# Patient Record
Sex: FEMALE | Race: WHITE | NOT HISPANIC OR LATINO | Employment: FULL TIME | URBAN - METROPOLITAN AREA
[De-identification: names, ages, dates, MRNs, and addresses within clinical notes are randomized per-mention and may not be internally consistent; named-entity substitution may affect disease eponyms.]

---

## 2021-05-04 ENCOUNTER — OFFICE VISIT (OUTPATIENT)
Dept: URGENT CARE | Facility: CLINIC | Age: 51
End: 2021-05-04
Payer: COMMERCIAL

## 2021-05-04 VITALS
HEIGHT: 71 IN | BODY MASS INDEX: 23.8 KG/M2 | TEMPERATURE: 97.5 F | RESPIRATION RATE: 18 BRPM | OXYGEN SATURATION: 98 % | HEART RATE: 95 BPM | DIASTOLIC BLOOD PRESSURE: 100 MMHG | SYSTOLIC BLOOD PRESSURE: 162 MMHG | WEIGHT: 170 LBS

## 2021-05-04 DIAGNOSIS — G51.0 BELL'S PALSY: Primary | ICD-10-CM

## 2021-05-04 PROCEDURE — 99213 OFFICE O/P EST LOW 20 MIN: CPT | Performed by: PHYSICIAN ASSISTANT

## 2021-05-04 RX ORDER — VALACYCLOVIR HYDROCHLORIDE 1 G/1
1000 TABLET, FILM COATED ORAL 2 TIMES DAILY
Qty: 14 TABLET | Refills: 0 | Status: SHIPPED | OUTPATIENT
Start: 2021-05-04 | End: 2021-05-11

## 2021-05-04 RX ORDER — PREDNISONE 10 MG/1
TABLET ORAL
Qty: 45 TABLET | Refills: 0 | Status: SHIPPED | OUTPATIENT
Start: 2021-05-04

## 2021-05-04 NOTE — LETTER
May 13, 2021     Patient: Julian Rossi   YOB: 1970   Date of Visit: 5/4/2021       To Whom it May Concern:    Julian Rosis was seen in my clinic on 5/4/2021  She may return to work 5/12/2021 with no restrictions  If you have any questions or concerns, please don't hesitate to call           Sincerely,          Esther Poole PA-C        CC: No Recipients

## 2021-05-04 NOTE — LETTER
May 4, 2021     Patient: Patti Forbes   YOB: 1970   Date of Visit: 5/4/2021       To Whom It May Concern: It is my medical opinion that Patti Forbes may return to work on 5/8/2021  If you have any questions or concerns, please don't hesitate to call           Sincerely,        Gerald Bingham PA-C

## 2021-05-04 NOTE — PROGRESS NOTES
[facility]        NAME: Brenden Landa is a 44 y o  female  : 1970    MRN: [id number]  DATE: May 4, 2021  TIME: 2:49 PM    Assessment and Plan   Bell's palsy [G51 0]  1  Bell's palsy  predniSONE 10 mg tablet    valACYclovir (VALTREX) 1,000 mg tablet    Ambulatory referral to [facility]     Had thorough discussion with patient possibility of stroke  Unable to evaluate for eyebrow motor as patient states eyebrow never works since initial Bell's palsy and concurrent use of Botox injections  Discussed with patient elevated blood pressure in conjunction with current symptoms  Verified with patient she received with [employer] or  [employer] vaccine and not Shiloh Negrete  Advised patient to seek evaluation emergency room but patient declined and signed AMA  Will treat for possible Bell's palsy with referral to family doctor and strict instructions to seek evaluation emergency room if symptoms worsen other way including syncope, confusion, headache, change in vision, slurred speech, numbness and tingling, or involvement of upper lower extremities  Patient reports she already has eyedrops at home  Patient Instructions   Take prednisone as prescribed  Do not use other ibuprofen while taking medication  Take antiviral medication as prescribed  Tape eye shut at night  Use eyedrops at home  Make appoint with family practice  Follow up with PCP in 3-5 days  Proceed to  ER if symptoms worsen  Chief Complaint     Chief Complaint   Patient presents with    Other     pain behind ear yesterday  bells palsy 2x previously  has been being treated with botox  covid vaccine 2 weeks ago  History of Present Illness       Patient is a 68-year-old female with significant past medical history of Bell's palsy presents the office complaining of facial droop since this morning    Patient reports last night she began having pain behind her right ear then woke up this morning with difficultly closing her right eye or use the right side of her mouth  Patient reports 2 prior episodes of Bell's palsy and admits to residual deficits  States she has been unable to raise her right eyebrow since initial episode  She recently moved to the area but has been receiving Botox for her history of Bell's palsy  She denies syncope, confusion, headache, change in vision, slurred speech, tongue involvement, dysphagia, nausea, vomiting, chest pain, shortness of breath, difficulty breathing, numbness and tingling, or unilateral weakness of her body  Denies history of DVT / PE, stroke, or other cardiovascular diseases  Review of Systems   Review of Systems   Constitutional: Negative for chills and fever  Eyes: Negative for visual disturbance  Respiratory: Negative for shortness of breath  Cardiovascular: Negative for chest pain and palpitations  Gastrointestinal: Negative for abdominal pain, diarrhea, nausea and vomiting  Neurological: Positive for facial asymmetry  Negative for dizziness, seizures, syncope, speech difficulty, weakness, light-headedness, numbness and headaches  Psychiatric/Behavioral: Negative for confusion           Current Medications       Current Outpatient Medications:     predniSONE 10 mg tablet, Take 6 pills x 5 days, 5 pills x 1 day, 4 pills x 1 day, 3 pills x 1 day, 2 pills x 1 day, 1 pill x 1 day, Disp: 45 tablet, Rfl: 0    valACYclovir (VALTREX) 1,000 mg tablet, Take 1 tablet (1,000 mg total) by mouth 2 (two) times a day for 7 days, Disp: 14 tablet, Rfl: 0    Current Allergies     Allergies as of 05/04/2021    (No Known Allergies)            The following portions of the patient's history were reviewed and updated as appropriate: allergies, current medications, past family history, past medical history, past social history, past surgical history and problem list      Past Medical History:   Diagnosis Date    Allergic     Bell's palsy        Past Surgical History: Procedure Laterality Date    NO PAST SURGERIES         No family history on file  Medications have been verified  Objective   /100   Pulse 95   Temp 97 5 °F (36 4 °C)   Resp 18   Ht 5' 11" (1 803 m)   Wt 77 1 kg (170 lb)   LMP 03/09/2021   SpO2 98%   BMI 23 71 kg/m²   Patient's last menstrual period was 03/09/2021  Physical Exam     Physical Exam  Vitals signs and nursing note reviewed  Constitutional:       Appearance: Normal appearance  She is well-developed  HENT:      Head: Normocephalic and atraumatic  Right Ear: Tympanic membrane, ear canal and external ear normal       Left Ear: Tympanic membrane, ear canal and external ear normal       Nose: Nose normal       Mouth/Throat:      Pharynx: Uvula midline  Eyes:      General: Lids are normal  Vision grossly intact  Extraocular Movements: Extraocular movements intact  Conjunctiva/sclera: Conjunctivae normal       Pupils: Pupils are equal, round, and reactive to light  Pupils are equal    Neck:      Musculoskeletal: Neck supple  Cardiovascular:      Rate and Rhythm: Normal rate and regular rhythm  Pulses: Normal pulses  Heart sounds: Normal heart sounds  No murmur  No friction rub  No gallop  Pulmonary:      Effort: Pulmonary effort is normal       Breath sounds: Normal breath sounds  No wheezing, rhonchi or rales  Musculoskeletal: Normal range of motion  Lymphadenopathy:      Cervical: No cervical adenopathy  Skin:     General: Skin is warm and dry  Capillary Refill: Capillary refill takes less than 2 seconds  Neurological:      Mental Status: She is alert  Sensory: Sensation is intact  Motor: Motor function is intact  Coordination: Coordination is intact  Gait: Gait is intact  Comments: Right-sided facial droop  Patient unable to smile or frown with right side of mouth  Patient able to shut right eye but with greater difficulty    Patient unable to raise right eyebrow  No slurred speech  Cranial nerves 2-12 intact with exception to facial nerve  Sensation equal and intact to gross touch bilateral   5/5 upper+lower extremity strength

## 2021-05-04 NOTE — LETTER
May 13, 2021     Patient: Luis F Alvarado   YOB: 1970   Date of Visit: 5/4/2021       To Whom it May Concern:    Luis F Alvarado was seen in my clinic on 5/4/2021  She may return to work 5/12/2021 without restrictions  If you have any questions or concerns, please don't hesitate to call           Sincerely,          Melisa Sequeira PA-C        CC: No Recipients

## 2021-05-04 NOTE — LETTER
May 4, 2021     Patient: Vianca Love   YOB: 1970   Date of Visit: 5/4/2021       To Whom It May Concern: It is my medical opinion that Vianca Love may return to work on 05/12/2021  If you have any questions or concerns, please don't hesitate to call           Sincerely,        Shannon Vincent PA-C    CC: No Recipients

## 2021-05-04 NOTE — PATIENT INSTRUCTIONS
Take prednisone as prescribed  Do not use other ibuprofen while taking medication  Take antiviral medication as prescribed  Tape eye shut at night  Use eyedrops at home  Make appoint with family practice  Go to ER if symptoms worsen in any way  Stroke   AMBULATORY CARE:   A stroke  happens when blood flow to part of the brain is interrupted  This can cause serious brain damage from a lack of oxygen  A stroke caused by a blood clot is called an ischemic stroke  A stroke caused by a burst or torn blood vessel is called an intracerebral hemorrhage, or a hemorrhagic stroke  When stroke symptoms go away completely within minutes to hours and do not cause damage, it is called a transient ischemic attack (TIA)  A TIA is a warning sign that you are at risk of soon having a stroke  Know the warning signs of a stroke: The words BE FAST can help you remember and recognize warning signs of a stroke:  · B = Balance:  Sudden loss of balance    · E = Eyes:  Loss of vision in one or both eyes    · F = Face:  Face droops on one side    · A = Arms:  Arm drops when both arms are raised    · S = Speech:  Speech is slurred or sounds different    · T = Time:  Time to get help immediately     Call your local emergency number (911 in the [address] [facility]) for any of the following:   · You have any of the following signs of a stroke:      ? Numbness or drooping on one side of your face     ? Weakness in an arm or leg    ? Confusion or difficulty speaking    ? Dizziness, a severe headache, or vision loss       · You have a seizure  · You have chest pain or shortness of breath  Seek care immediately if:   · Your arm or leg feels warm, tender, and painful  It may look swollen and red  · You have loss of balance or coordination  · You have double vision or vision loss  · You have unusual or heavy bleeding      Call your doctor or neurologist if:   · Your blood sugar level or blood pressure is higher or lower than usual     · You have trouble swallowing  · You have trouble having a bowel movement or urinating  · You have questions or concerns about your condition or care  Signs and symptoms  depend on the type of stroke you had and where it occurred:  · Loss of consciousness    · Loss of vision in one or both eyes    · Sudden weakness or paralysis in your arm, leg, or face    · Sudden trouble walking, speaking, or understanding words you hear or read    · Vomiting or a severe headache    Treatment  depends on the type of stroke you had:  · Medicines  may be given to prevent or break up blood clots, or help your blood clot more easily  You may also need medicines to treat high cholesterol, high blood pressure, or diabetes  · Thrombolysis  is a procedure used to break apart clots in an artery  A catheter is guided into the artery until it is near the clot  Medicine is put through the catheter that will help break apart the clot  The clot may be pulled out of the artery  · Surgery  may be used to remove a blood clot or to relieve pressure within your brain  You may also need surgery to remove plaque buildup from your carotid arteries  Recovery testing: Your healthcare provider will test your recovery 90 days (3 months) after your stroke  This may be done over the phone or in person  Your provider will ask how well you can do the activities you did before the stroke  He or she will also ask how well you can do your daily activities without help  Your provider may make recommendations for you based on your test  For example, you may need someone to help you walk safely  You may also need help with daily activities, such as getting dressed  Based on your answers, your provider may do this test again over time  Manage the effects of a stroke:   · Go to stroke rehabilitation (rehab) if directed    Rehab is a program run by specialists who will help you recover abilities you may have lost  Specialists include physical, occupational, and speech therapists  Physical therapists help you gain strength or keep your balance  Occupational therapists teach you new ways to do daily activities  Your therapy may include movements for everyday activities  An example is being able to raise yourself from a chair  A speech therapist helps you improve your ability to talk and swallow  · Make your home safe  Remove anything you might trip over  Tape electrical cords down  Keep paths clear throughout your home  Make sure your home is well lit  Put nonslip materials on surfaces that might be slippery  An example is your bathtub or shower floor  A cane or walker may help you keep your balance as you walk  Prevent another stroke:   · Manage health conditions  A condition such as diabetes can increase your risk for a stroke  Control your blood sugar level if you have hyperglycemia or diabetes  Take your prescribed medicines and check your blood sugar level as directed  · Check your blood pressure as directed  High blood pressure can increase your risk for a stroke  Follow your healthcare provider's directions for controlling your blood pressure  · Do not use nicotine products or illegal drugs  Nicotine and other chemicals in cigarettes and cigars can cause blood vessel damage  Nicotine and illegal drugs both increase your risk for a stroke  Ask your healthcare provider for information if you currently smoke or use drugs and need help to quit  E- cigarettes or smokeless tobacco still contain nicotine  Talk to your healthcare provider before you use these products  · Talk to your healthcare provider about alcohol  Alcohol can raise your blood pressure  The recommended limit is 2 drinks in a day for men and 1 drink in a day for women  Do not binge drink or save a week's worth of alcohol to drink in 1 or 2 days  Limit weekly amounts as directed by your provider  · Eat a variety of healthy foods    Healthy foods include whole-grain breads, low-fat dairy products, beans, lean meats, and fish  Eat at least 5 servings of fruits and vegetables each day  Choose foods that are low in fat, cholesterol, salt, and sugar  Eat foods that are high in potassium, such as potatoes and bananas  A dietitian can help you create healthy meal plans  · Maintain a healthy weight  Ask your healthcare provider how much you should weigh  Ask him or her to help you create a weight loss plan if you are overweight  He or she can help you create small goals if you have a lot of weight to lose  · Exercise as directed  Exercise can lower your blood pressure, cholesterol, weight, and blood sugar levels  Healthcare providers will help you create exercise goals  They can also help you make a plan to reach your goals  For example, you can break exercise into 10 minute periods, 3 times in the day  Find an exercise that you enjoy  This will make it easier for you to reach your exercise goals  · Manage stress  Stress can raise your blood pressure  Find new ways to relax, such as deep breathing or listening to music  What you need to know about depression after a stroke:  Talk to your healthcare provider if you have depression that continues or is getting worse  Your provider may be able to help treat your depression  Your provider can also recommend support groups for you to join  A support group is a place to talk with others who have had a stroke  It may also help to talk to friends and family members about how you are feeling  Tell your family and friends to let your healthcare provider know if they see any signs of depression:  · Extreme sadness    · Avoiding social interaction with family or friends    · A lack of interest in things you once enjoyed    · Irritability    · Trouble sleeping    · Low energy levels    · A change in eating habits or sudden weight gain or loss    Follow up with your doctor or neurologist as directed:   You may need to come in for regular tests of your brain function  Your medicines may also need to be checked  Write down your questions so you remember to ask them during your visits  For support and more information:   · National Stroke Association  [address] , [address]  Phone: 1- 473 - 201-3339  Web Address: [employer] [facility]  [address] Information is for End User's use only and may not be sold, redistributed or otherwise used for commercial purposes  All illustrations and images included in CareNotes® are the copyrighted property of A D A M , Inc  or [address] [place] [address] [facility]  The above information is an  only  It is not intended as medical advice for individual conditions or treatments  Talk to your doctor, nurse or pharmacist before following any medical regimen to see if it is safe and effective for you  Cardoza Palsy   WHAT YOU NEED TO KNOW:   Bell palsy is a sudden weakness or paralysis of one side of your face  Bell palsy occurs when the nerve that controls the muscles in your face becomes swollen or irritated  DISCHARGE INSTRUCTIONS:   Medicines:   · Medicine may be given  to decrease swelling and irritation of your facial nerve  You may receive antiviral medicine if your healthcare provider thinks a virus caused your Bell palsy  Your healthcare provider may also suggest acetaminophen or ibuprofen to reduce pain  These medicines are available without a doctor's order  Ask which medicine to take, and how much you need  Follow directions  Acetaminophen can cause liver damage, and ibuprofen can cause stomach bleeding or kidney damage  · Take your medicine as directed  Contact your healthcare provider if you think your medicine is not helping or if you have side effects  Tell him if you are allergic to any medicine  Keep a list of the medicines, vitamins, and herbs you take  Include the amounts, and when and why you take them   Bring the list or the pill bottles to follow-up visits  Carry your medicine list with you in case of an emergency  Follow up with your healthcare provider as directed:  Write down your questions so you remember to ask them during your visits  Eye care: Your healthcare provider may recommend that you use artificial tears during the day to keep your eye moist  You may need to use an eye ointment at night  You may also need to wear a patch over your eye and tape it shut while you sleep  This helps keep your eye from getting dry and infected  Wear sunglasses to protect your eye from direct sunlight  Stay away from places that have fumes, dust, or other particles in the air that may harm your eye  Physical therapy:  A physical therapist may teach you how to massage your face and exercise the nerves and muscles in your face  This may help you get better sooner and prevent long-term problems  You can exercise on your own when your facial movement begins to return  Open and close your eye, wink, and smile wide  Do the exercises for 15 or 20 minutes several times a day  Contact your healthcare provider if:   · You have a fever  · Your eye becomes red, irritated, or painful  · You have questions or concerns about your condition or care  Return to the emergency department if:   · You develop weakness or numbness on one side of your body (other than your face)  · You have double vision, or you lose vision in your eye  · You have trouble thinking clearly  © Copyright [address] Information is for End User's use only and may not be sold, redistributed or otherwise used for commercial purposes  All illustrations and images included in CareNotes® are the copyrighted property of A D A M , Inc  or [facility]   The above information is an  only  It is not intended as medical advice for individual conditions or treatments   Talk to your doctor, nurse or pharmacist before following any medical regimen to see if it is safe and effective for you

## 2023-08-09 ENCOUNTER — APPOINTMENT (EMERGENCY)
Dept: CT IMAGING | Facility: HOSPITAL | Age: 53
DRG: 711 | End: 2023-08-09
Payer: COMMERCIAL

## 2023-08-09 ENCOUNTER — HOSPITAL ENCOUNTER (INPATIENT)
Facility: HOSPITAL | Age: 53
LOS: 3 days | Discharge: HOME HEALTH | DRG: 711 | End: 2023-08-12
Attending: EMERGENCY MEDICINE | Admitting: SPECIALIST
Payer: COMMERCIAL

## 2023-08-09 ENCOUNTER — APPOINTMENT (EMERGENCY)
Dept: RADIOLOGY | Facility: HOSPITAL | Age: 53
DRG: 711 | End: 2023-08-09
Payer: COMMERCIAL

## 2023-08-09 DIAGNOSIS — L02.415 ABSCESS OF RIGHT THIGH: ICD-10-CM

## 2023-08-09 DIAGNOSIS — M79.604 RIGHT LEG PAIN: Primary | ICD-10-CM

## 2023-08-09 PROBLEM — A41.9 SEPSIS (HCC): Status: ACTIVE | Noted: 2023-08-09

## 2023-08-09 PROBLEM — Z17.1 MALIGNANT NEOPLASM OF UPPER-INNER QUADRANT OF LEFT BREAST IN FEMALE, ESTROGEN RECEPTOR NEGATIVE (HCC): Status: ACTIVE | Noted: 2021-10-19

## 2023-08-09 PROBLEM — C50.212 MALIGNANT NEOPLASM OF UPPER-INNER QUADRANT OF LEFT BREAST IN FEMALE, ESTROGEN RECEPTOR NEGATIVE (HCC): Status: ACTIVE | Noted: 2021-10-19

## 2023-08-09 PROBLEM — I10 ESSENTIAL HYPERTENSION: Status: ACTIVE | Noted: 2021-11-16

## 2023-08-09 PROBLEM — I42.0 DILATED CARDIOMYOPATHY (HCC): Status: ACTIVE | Noted: 2021-11-16

## 2023-08-09 PROBLEM — Z90.12 STATUS POST PARTIAL MASTECTOMY OF LEFT BREAST: Status: ACTIVE | Noted: 2022-07-15

## 2023-08-09 LAB
ALBUMIN SERPL BCP-MCNC: 4 G/DL (ref 3.5–5)
ALP SERPL-CCNC: 73 U/L (ref 34–104)
ALT SERPL W P-5'-P-CCNC: 36 U/L (ref 7–52)
ANION GAP SERPL CALCULATED.3IONS-SCNC: 11 MMOL/L
APTT PPP: 26 SECONDS (ref 23–37)
AST SERPL W P-5'-P-CCNC: 28 U/L (ref 13–39)
ATRIAL RATE: 87 BPM
BASOPHILS # BLD AUTO: 0.08 THOUSANDS/ÂΜL (ref 0–0.1)
BASOPHILS NFR BLD AUTO: 1 % (ref 0–1)
BILIRUB SERPL-MCNC: 1.46 MG/DL (ref 0.2–1)
BUN SERPL-MCNC: 14 MG/DL (ref 5–25)
CALCIUM SERPL-MCNC: 9.4 MG/DL (ref 8.4–10.2)
CHLORIDE SERPL-SCNC: 103 MMOL/L (ref 96–108)
CK SERPL-CCNC: 127 U/L (ref 26–192)
CO2 SERPL-SCNC: 21 MMOL/L (ref 21–32)
CREAT SERPL-MCNC: 0.75 MG/DL (ref 0.6–1.3)
EOSINOPHIL # BLD AUTO: 0.12 THOUSAND/ÂΜL (ref 0–0.61)
EOSINOPHIL NFR BLD AUTO: 1 % (ref 0–6)
ERYTHROCYTE [DISTWIDTH] IN BLOOD BY AUTOMATED COUNT: 14.3 % (ref 11.6–15.1)
GFR SERPL CREATININE-BSD FRML MDRD: 90 ML/MIN/1.73SQ M
GLUCOSE SERPL-MCNC: 123 MG/DL (ref 65–140)
HCT VFR BLD AUTO: 35.4 % (ref 34.8–46.1)
HGB BLD-MCNC: 11.4 G/DL (ref 11.5–15.4)
IMM GRANULOCYTES # BLD AUTO: 0.08 THOUSAND/UL (ref 0–0.2)
IMM GRANULOCYTES NFR BLD AUTO: 1 % (ref 0–2)
INR PPP: 0.95 (ref 0.84–1.19)
LACTATE SERPL-SCNC: 1.1 MMOL/L (ref 0.5–2)
LYMPHOCYTES # BLD AUTO: 0.96 THOUSANDS/ÂΜL (ref 0.6–4.47)
LYMPHOCYTES NFR BLD AUTO: 7 % (ref 14–44)
MCH RBC QN AUTO: 27.9 PG (ref 26.8–34.3)
MCHC RBC AUTO-ENTMCNC: 32.2 G/DL (ref 31.4–37.4)
MCV RBC AUTO: 87 FL (ref 82–98)
MONOCYTES # BLD AUTO: 1.27 THOUSAND/ÂΜL (ref 0.17–1.22)
MONOCYTES NFR BLD AUTO: 10 % (ref 4–12)
NEUTROPHILS # BLD AUTO: 10.74 THOUSANDS/ÂΜL (ref 1.85–7.62)
NEUTS SEG NFR BLD AUTO: 80 % (ref 43–75)
NRBC BLD AUTO-RTO: 0 /100 WBCS
P AXIS: 46 DEGREES
PLATELET # BLD AUTO: 406 THOUSANDS/UL (ref 149–390)
PMV BLD AUTO: 9.1 FL (ref 8.9–12.7)
POTASSIUM SERPL-SCNC: 3.8 MMOL/L (ref 3.5–5.3)
PR INTERVAL: 156 MS
PROCALCITONIN SERPL-MCNC: 0.17 NG/ML
PROT SERPL-MCNC: 7.8 G/DL (ref 6.4–8.4)
PROTHROMBIN TIME: 12.7 SECONDS (ref 11.6–14.5)
QRS AXIS: 66 DEGREES
QRSD INTERVAL: 104 MS
QT INTERVAL: 394 MS
QTC INTERVAL: 474 MS
RBC # BLD AUTO: 4.09 MILLION/UL (ref 3.81–5.12)
SODIUM SERPL-SCNC: 135 MMOL/L (ref 135–147)
T WAVE AXIS: 86 DEGREES
VENTRICULAR RATE: 87 BPM
WBC # BLD AUTO: 13.25 THOUSAND/UL (ref 4.31–10.16)

## 2023-08-09 PROCEDURE — 85610 PROTHROMBIN TIME: CPT | Performed by: EMERGENCY MEDICINE

## 2023-08-09 PROCEDURE — 96375 TX/PRO/DX INJ NEW DRUG ADDON: CPT

## 2023-08-09 PROCEDURE — G1004 CDSM NDSC: HCPCS

## 2023-08-09 PROCEDURE — 87186 SC STD MICRODIL/AGAR DIL: CPT | Performed by: SPECIALIST

## 2023-08-09 PROCEDURE — 96366 THER/PROPH/DIAG IV INF ADDON: CPT

## 2023-08-09 PROCEDURE — 99223 1ST HOSP IP/OBS HIGH 75: CPT | Performed by: SPECIALIST

## 2023-08-09 PROCEDURE — 99284 EMERGENCY DEPT VISIT MOD MDM: CPT

## 2023-08-09 PROCEDURE — 96368 THER/DIAG CONCURRENT INF: CPT

## 2023-08-09 PROCEDURE — 73701 CT LOWER EXTREMITY W/DYE: CPT

## 2023-08-09 PROCEDURE — 85025 COMPLETE CBC W/AUTO DIFF WBC: CPT | Performed by: EMERGENCY MEDICINE

## 2023-08-09 PROCEDURE — 0J9L0ZZ DRAINAGE OF RIGHT UPPER LEG SUBCUTANEOUS TISSUE AND FASCIA, OPEN APPROACH: ICD-10-PCS | Performed by: SPECIALIST

## 2023-08-09 PROCEDURE — 96365 THER/PROPH/DIAG IV INF INIT: CPT

## 2023-08-09 PROCEDURE — 99285 EMERGENCY DEPT VISIT HI MDM: CPT | Performed by: EMERGENCY MEDICINE

## 2023-08-09 PROCEDURE — 80053 COMPREHEN METABOLIC PANEL: CPT | Performed by: EMERGENCY MEDICINE

## 2023-08-09 PROCEDURE — 87147 CULTURE TYPE IMMUNOLOGIC: CPT | Performed by: SPECIALIST

## 2023-08-09 PROCEDURE — 82550 ASSAY OF CK (CPK): CPT | Performed by: EMERGENCY MEDICINE

## 2023-08-09 PROCEDURE — NC001 PR NO CHARGE: Performed by: SPECIALIST

## 2023-08-09 PROCEDURE — 87040 BLOOD CULTURE FOR BACTERIA: CPT | Performed by: EMERGENCY MEDICINE

## 2023-08-09 PROCEDURE — 84145 PROCALCITONIN (PCT): CPT | Performed by: EMERGENCY MEDICINE

## 2023-08-09 PROCEDURE — 93005 ELECTROCARDIOGRAM TRACING: CPT

## 2023-08-09 PROCEDURE — 87205 SMEAR GRAM STAIN: CPT | Performed by: SPECIALIST

## 2023-08-09 PROCEDURE — 87070 CULTURE OTHR SPECIMN AEROBIC: CPT | Performed by: SPECIALIST

## 2023-08-09 PROCEDURE — 83605 ASSAY OF LACTIC ACID: CPT | Performed by: EMERGENCY MEDICINE

## 2023-08-09 PROCEDURE — 85730 THROMBOPLASTIN TIME PARTIAL: CPT | Performed by: EMERGENCY MEDICINE

## 2023-08-09 PROCEDURE — 71045 X-RAY EXAM CHEST 1 VIEW: CPT

## 2023-08-09 PROCEDURE — 10061 I&D ABSCESS COMP/MULTIPLE: CPT | Performed by: SPECIALIST

## 2023-08-09 PROCEDURE — 36415 COLL VENOUS BLD VENIPUNCTURE: CPT | Performed by: EMERGENCY MEDICINE

## 2023-08-09 RX ORDER — MAGNESIUM CHLORIDE 64 MG
64 TABLET, DELAYED RELEASE (ENTERIC COATED) ORAL DAILY
Status: DISCONTINUED | OUTPATIENT
Start: 2023-08-09 | End: 2023-08-12 | Stop reason: HOSPADM

## 2023-08-09 RX ORDER — CEFAZOLIN SODIUM 2 G/50ML
2000 SOLUTION INTRAVENOUS EVERY 8 HOURS
Status: DISCONTINUED | OUTPATIENT
Start: 2023-08-09 | End: 2023-08-11

## 2023-08-09 RX ORDER — ENOXAPARIN SODIUM 100 MG/ML
40 INJECTION SUBCUTANEOUS DAILY
Status: DISCONTINUED | OUTPATIENT
Start: 2023-08-09 | End: 2023-08-12 | Stop reason: HOSPADM

## 2023-08-09 RX ORDER — LOSARTAN POTASSIUM 50 MG/1
50 TABLET ORAL DAILY
Status: DISCONTINUED | OUTPATIENT
Start: 2023-08-09 | End: 2023-08-12 | Stop reason: HOSPADM

## 2023-08-09 RX ORDER — TURMERIC 400 MG
1 CAPSULE ORAL DAILY
COMMUNITY

## 2023-08-09 RX ORDER — HYDROCODONE BITARTRATE AND ACETAMINOPHEN 5; 325 MG/1; MG/1
1 TABLET ORAL EVERY 6 HOURS PRN
Status: DISCONTINUED | OUTPATIENT
Start: 2023-08-09 | End: 2023-08-11

## 2023-08-09 RX ORDER — VALSARTAN 80 MG/1
80 TABLET ORAL DAILY
COMMUNITY
Start: 2023-06-13

## 2023-08-09 RX ORDER — MORPHINE SULFATE 4 MG/ML
4 INJECTION, SOLUTION INTRAMUSCULAR; INTRAVENOUS ONCE
Status: COMPLETED | OUTPATIENT
Start: 2023-08-09 | End: 2023-08-09

## 2023-08-09 RX ORDER — SODIUM CHLORIDE, SODIUM LACTATE, POTASSIUM CHLORIDE, CALCIUM CHLORIDE 600; 310; 30; 20 MG/100ML; MG/100ML; MG/100ML; MG/100ML
150 INJECTION, SOLUTION INTRAVENOUS CONTINUOUS
Status: DISCONTINUED | OUTPATIENT
Start: 2023-08-09 | End: 2023-08-10

## 2023-08-09 RX ORDER — MAGNESIUM CHLORIDE 64 MG
64 TABLET, DELAYED RELEASE (ENTERIC COATED) ORAL DAILY
COMMUNITY
Start: 2023-02-24

## 2023-08-09 RX ORDER — BIOTIN 1 MG
1 TABLET ORAL 3 TIMES DAILY
COMMUNITY

## 2023-08-09 RX ADMIN — SODIUM CHLORIDE 3 G: 9 INJECTION, SOLUTION INTRAVENOUS at 10:37

## 2023-08-09 RX ADMIN — HYDROCODONE BITARTRATE AND ACETAMINOPHEN 1 TABLET: 5; 325 TABLET ORAL at 22:38

## 2023-08-09 RX ADMIN — VITAM B12 100 MCG: 100 TAB at 15:07

## 2023-08-09 RX ADMIN — SODIUM CHLORIDE, SODIUM LACTATE, POTASSIUM CHLORIDE, AND CALCIUM CHLORIDE 150 ML/HR: .6; .31; .03; .02 INJECTION, SOLUTION INTRAVENOUS at 22:39

## 2023-08-09 RX ADMIN — CEFAZOLIN SODIUM 2000 MG: 2 SOLUTION INTRAVENOUS at 15:08

## 2023-08-09 RX ADMIN — HYDROCODONE BITARTRATE AND ACETAMINOPHEN 1 TABLET: 5; 325 TABLET ORAL at 16:32

## 2023-08-09 RX ADMIN — CEFAZOLIN SODIUM 2000 MG: 2 SOLUTION INTRAVENOUS at 22:38

## 2023-08-09 RX ADMIN — MAGNESIUM 64 MG (MAGNESIUM CHLORIDE) TABLET,DELAYED RELEASE 64 MG: at 15:08

## 2023-08-09 RX ADMIN — LOSARTAN POTASSIUM 50 MG: 50 TABLET, FILM COATED ORAL at 15:06

## 2023-08-09 RX ADMIN — SODIUM CHLORIDE, SODIUM LACTATE, POTASSIUM CHLORIDE, AND CALCIUM CHLORIDE 150 ML/HR: .6; .31; .03; .02 INJECTION, SOLUTION INTRAVENOUS at 10:30

## 2023-08-09 RX ADMIN — Medication 1 TABLET: at 15:06

## 2023-08-09 RX ADMIN — IOHEXOL 100 ML: 350 INJECTION, SOLUTION INTRAVENOUS at 11:31

## 2023-08-09 RX ADMIN — SODIUM CHLORIDE, SODIUM LACTATE, POTASSIUM CHLORIDE, AND CALCIUM CHLORIDE 2000 ML: .6; .31; .03; .02 INJECTION, SOLUTION INTRAVENOUS at 10:29

## 2023-08-09 RX ADMIN — MORPHINE SULFATE 4 MG: 4 INJECTION INTRAVENOUS at 10:27

## 2023-08-09 RX ADMIN — ENOXAPARIN SODIUM 40 MG: 40 INJECTION SUBCUTANEOUS at 15:07

## 2023-08-09 NOTE — ED PROVIDER NOTES
History  Chief Complaint   Patient presents with   • Leg Pain     Had surgery on her leg 31st where she had part of a muscle taken from her leg and put in her face. Pain started Sunday and took pain medication Monday, yesterday received Abx and told to see PCP if pain increases or fever. PCP told her to come to ED. Patient is a pleasant 57-year-old female to the emergency room complaining of severe right thigh pain. Patient underwent muscle transplantation surgery in [facility] on July 31. She had a drain pulled from her right thigh on Saturday and had been discharged to home. She started to develop pain on Sunday and then yesterday was started on antibiotic therapy by her surgeon in [facility]. Swelling became worse. Pain increased. Patient apparently spoke to surgeon who advised her to follow-up with PCP. PCP referred patient to the emergency department. Patient has a history of right-sided facial droop secondary to Bell's palsy. Patient reported to me that she had gone to [facility] for this surgery to correct that facial issue. History provided by:  Patient and relative  Leg Pain  Location:  Leg  Leg location:  R upper leg  Pain details:     Quality:  Aching and pressure    Radiates to:  Does not radiate    Severity:  Severe    Onset quality:  Gradual    Timing:  Constant    Progression:  Worsening  Associated symptoms: fever (Low-grade)        Prior to Admission Medications   Prescriptions Last Dose Informant Patient Reported? Taking?    Biotin 1000 MCG tablet   Yes Yes   Sig: Take 1 mg by mouth Three times a day   Multiple Vitamin (MULTIVITAMIN PO)   Yes Yes   Sig: Take 1 tablet by mouth daily   Turmeric 400 MG CAPS   Yes Yes   Sig: Take 1 capsule by mouth daily   cyanocobalamin (VITAMIN B-12) 100 MCG tablet   Yes Yes   Sig: Take 100 mcg by mouth daily   dapagliflozin (Farxiga) 10 MG tablet   Yes Yes   Sig: Take 10 mg by mouth daily   magnesium chloride (MAG64) 64 MG TBEC EC tablet   Yes Yes   Sig: Take 64 mg by mouth in the morning   valsartan (DIOVAN) 80 mg tablet   Yes Yes   Sig: Take 80 mg by mouth daily      Facility-Administered Medications: None       Past Medical History:   Diagnosis Date   • Allergic    • Bell's palsy        Past Surgical History:   Procedure Laterality Date   • GRACILLIS MUSCLE TRANSFER AND  STEINDLER FLEXOR PLASTY Right    • NO PAST SURGERIES         History reviewed. No pertinent family history. I have reviewed and agree with the history as documented. E-Cigarette/Vaping   • E-Cigarette Use Never User      E-Cigarette/Vaping Substances     Social History     Tobacco Use   • Smoking status: Never   • Smokeless tobacco: Never   Vaping Use   • Vaping Use: Never used   Substance Use Topics   • Alcohol use: Never   • Drug use: Never       Review of Systems   Constitutional: Positive for fever (Low-grade). Respiratory: Negative for shortness of breath and wheezing. Cardiovascular: Negative for chest pain and palpitations. Gastrointestinal: Negative for diarrhea, nausea and vomiting. Musculoskeletal: Positive for myalgias. Skin: Positive for color change and rash. All other systems reviewed and are negative. Physical Exam  Physical Exam  Vitals and nursing note reviewed. Constitutional:       General: She is awake. She is in acute distress. Appearance: Normal appearance. She is well-developed. She is not ill-appearing or toxic-appearing. HENT:      Head: Normocephalic and atraumatic. Right Ear: External ear normal.      Left Ear: External ear normal.      Nose: Nose normal.      Mouth/Throat:      Mouth: Mucous membranes are moist.   Eyes:      General: Lids are normal. No scleral icterus. Extraocular Movements: Extraocular movements intact. Pupils: Pupils are equal, round, and reactive to light. Cardiovascular:      Rate and Rhythm: Regular rhythm. Tachycardia present. Heart sounds: Normal heart sounds. No murmur heard.   Pulmonary: Effort: Pulmonary effort is normal. No respiratory distress. Breath sounds: Normal breath sounds. No wheezing, rhonchi or rales. Abdominal:      General: Abdomen is flat. There is no distension. Palpations: Abdomen is soft. Tenderness: There is no abdominal tenderness. There is no guarding or rebound. Musculoskeletal:         General: Swelling and tenderness present. No deformity. Normal range of motion. Cervical back: Normal range of motion and neck supple. Right upper leg: Swelling, edema and tenderness present. Legs:       Comments: Distal neurovascular appears to be intact at this time. Skin:     General: Skin is warm and dry. Coloration: Skin is not jaundiced or pale. Findings: No rash. Neurological:      Mental Status: She is alert and oriented to person, place, and time. Mental status is at baseline. Cranial Nerves: No cranial nerve deficit. Motor: No weakness. Psychiatric:         Attention and Perception: Attention normal.         Mood and Affect: Mood is anxious. Affect is tearful.          Speech: Speech normal.         Behavior: Behavior normal.         Vital Signs  ED Triage Vitals [08/09/23 1004]   Temperature Pulse Respirations Blood Pressure SpO2   (!) 97.3 °F (36.3 °C) (!) 107 18 108/65 98 %      Temp Source Heart Rate Source Patient Position - Orthostatic VS BP Location FiO2 (%)   Temporal Monitor Sitting Right arm --      Pain Score       9           Vitals:    08/09/23 1004 08/09/23 1100 08/09/23 1300   BP: 108/65     Pulse: (!) 107 89 80   Patient Position - Orthostatic VS: Sitting           Visual Acuity      ED Medications  Medications   lactated ringers infusion (150 mL/hr Intravenous New Bag 8/9/23 1030)   morphine injection 4 mg (4 mg Intravenous Given 8/9/23 1027)   ampicillin-sulbactam (UNASYN) 3 g in sodium chloride 0.9 % 100 mL IVPB (0 g Intravenous Stopped 8/9/23 1149)   lactated ringers bolus 2,000 mL (0 mL Intravenous Stopped 8/9/23 1303)   iohexol (OMNIPAQUE) 350 MG/ML injection (SINGLE-DOSE) 100 mL (100 mL Intravenous Given 8/9/23 1131)       Diagnostic Studies  Results Reviewed     Procedure Component Value Units Date/Time    Wound culture and Gram stain [[id number]] Collected: 08/09/23 1303    Lab Status: In process Specimen: Wound from Leg, Right Updated: 08/09/23 1306    Procalcitonin [[id number]]  (Normal) Collected: 08/09/23 1021    Lab Status: Final result Specimen: Blood from Arm, Left Updated: 08/09/23 1058     Procalcitonin 0.17 ng/ml     Lactic acid [[id number]]  (Normal) Collected: 08/09/23 1021    Lab Status: Final result Specimen: Blood from Arm, Left Updated: 08/09/23 1057     LACTIC ACID 1.1 mmol/L     Narrative:      Result may be elevated if tourniquet was used during collection.     CK [[id number]]  (Normal) Collected: 08/09/23 1021    Lab Status: Final result Specimen: Blood from Arm, Left Updated: 08/09/23 1057     Total  U/L     Comprehensive metabolic panel [[id number]]  (Abnormal) Collected: 08/09/23 1021    Lab Status: Final result Specimen: Blood from Arm, Left Updated: 08/09/23 1057     Sodium 135 mmol/L      Potassium 3.8 mmol/L      Chloride 103 mmol/L      CO2 21 mmol/L      ANION GAP 11 mmol/L      BUN 14 mg/dL      Creatinine 0.75 mg/dL      Glucose 123 mg/dL      Calcium 9.4 mg/dL      AST 28 U/L      ALT 36 U/L      Alkaline Phosphatase 73 U/L      Total Protein 7.8 g/dL      Albumin 4.0 g/dL      Total Bilirubin 1.46 mg/dL      eGFR 90 ml/min/1.73sq m     Narrative:      [place] guidelines for Chronic Kidney Disease (CKD):   •  Stage 1 with normal or high GFR (GFR > 90 mL/min/1.73 square meters)  •  Stage 2 Mild CKD (GFR = 60-89 mL/min/1.73 square meters)  •  Stage 3A Moderate CKD (GFR = 45-59 mL/min/1.73 square meters)  •  Stage 3B Moderate CKD (GFR = 30-44 mL/min/1.73 square meters)  •  Stage 4 Severe CKD (GFR = 15-29 mL/min/1.73 square meters)  •  Stage 5 End Stage CKD (GFR <15 mL/min/1.73 square meters)  Note: GFR calculation is accurate only with a steady state creatinine    Protime-INR [[id number]]  (Normal) Collected: 08/09/23 1021    Lab Status: Final result Specimen: Blood from Arm, Left Updated: 08/09/23 1045     Protime 12.7 seconds      INR 0.95    APTT [[id number]]  (Normal) Collected: 08/09/23 1021    Lab Status: Final result Specimen: Blood from Arm, Left Updated: 08/09/23 1045     PTT 26 seconds     CBC and differential [[id number]]  (Abnormal) Collected: 08/09/23 1021    Lab Status: Final result Specimen: Blood from Arm, Left Updated: 08/09/23 1028     WBC 13.25 Thousand/uL      RBC 4.09 Million/uL      Hemoglobin 11.4 g/dL      Hematocrit 35.4 %      MCV 87 fL      MCH 27.9 pg      MCHC 32.2 g/dL      RDW 14.3 %      MPV 9.1 fL      Platelets 778 Thousands/uL      nRBC 0 /100 WBCs      Neutrophils Relative 80 %      Immat GRANS % 1 %      Lymphocytes Relative 7 %      Monocytes Relative 10 %      Eosinophils Relative 1 %      Basophils Relative 1 %      Neutrophils Absolute 10.74 Thousands/µL      Immature Grans Absolute 0.08 Thousand/uL      Lymphocytes Absolute 0.96 Thousands/µL      Monocytes Absolute 1.27 Thousand/µL      Eosinophils Absolute 0.12 Thousand/µL      Basophils Absolute 0.08 Thousands/µL     Blood culture #2 [[id number]] Collected: 08/09/23 1021    Lab Status: In process Specimen: Blood from Arm, Left Updated: 08/09/23 1027    Blood culture #1 [[id number]] Collected: 08/09/23 1021    Lab Status: In process Specimen: Blood from Arm, Right Updated: 08/09/23 1027                 CT lower extremity w contrast right   Final Result by Kane Guo DO (08/09 1145)      Rim-enhancing fluid collection in the medial thigh, containing multiple locules of gas in the medial thigh. Findings are concerning for abscess. Postoperative hematoma or seroma not excluded. The study was marked in [facility] for immediate notification.       Workstation performed: [id number]         XR chest portable   ED Interpretation by Magen Parr DO (08/09 1101)   No active disease      Final Result by Tisha Narvaez MD (08/09 1113)      No acute cardiopulmonary disease. Workstation performed: [id number]                    Procedures  ECG 12 Lead Documentation Only    Date/Time: 8/9/2023 11:06 AM    Performed by: Magen Parr DO  Authorized by: Magen Parr DO    Indications / Diagnosis:  Fever eval  ECG reviewed by me, the ED Provider: yes    Patient location:  ED  Previous ECG:     Previous ECG:  Unavailable  Interpretation:     Interpretation: normal    Rate:     ECG rate assessment: normal    Rhythm:     Rhythm: sinus rhythm    Ectopy:     Ectopy: none    QRS:     QRS axis:  Normal  Conduction:     Conduction: normal    ST segments:     ST segments:  Normal  T waves:     T waves: normal               ED Course  ED Course as of 08/09/23 1329   Wed Aug 09, 2023   1020 Patient's BMI > 30. For purposes of fluid resuscitation, IBW was utilized to calculate target volume to be administered. 80 CT concerning for a postoperative abscess. Hematoma or seroma is not ruled out. Case discussed with surgery. They will be down to see the patient. 1207 Case also discussed with Dr Jose Alberto Prakash [facility] surgeon [phone]). Recommends removing a few of the sutures, allow for drainage and then pack. He is agreeable to following remotely with home care nursing. [facility] Dr. Abbe Do to see patient. Plans for bedside procedure. 1327 Surgery will admit to their service. SBIRT 22yo+    Flowsheet Row Most Recent Value   Initial Alcohol Screen: US AUDIT-C     1. How often do you have a drink containing alcohol? 0 Filed at: 08/09/2023 1004   2. How many drinks containing alcohol do you have on a typical day you are drinking? 0 Filed at: 08/09/2023 1004   3b. FEMALE Any Age, or MALE 65+:  How often do you have 4 or more drinks on one occassion? 0 Filed at: 08/09/2023 1004   Audit-C Score 0 Filed at: 08/09/2023 1004   HERRERA: How many times in the past year have you. .. Used an illegal drug or used a prescription medication for non-medical reasons? Never Filed at: 08/09/2023 1004                    Medical Decision Making  Patient presented to the emergency department and a MSE was performed. The patient was evaluated for complaint related to acute right-sided thigh pain and swelling. Patient is potentially at risk for, but not limited to, abscess, cellulitis, necrotizing fasciitis, compartment syndrome, hematoma formation. Several of these diagnoses have been evaluated and ruled out by history and physical.  As needed, patient will be further evaluated with laboratory and imaging studies. Higher level diagnostics, such as CT imaging or ultrasound, may also be required. Please see work-up portion of the note for further evaluation of patient's risk. Socioeconomic factors were also considered as part of the decision-making process. Unless otherwise stated in the chart or patient is admitted as elsewhere documented, any previously prescribed medications will be maintained. Abscess of right thigh: complicated acute illness or injury with systemic symptoms that poses a threat to life or bodily functions  Right leg pain: acute illness or injury  Amount and/or Complexity of Data Reviewed  Labs: ordered. Radiology: ordered and independent interpretation performed. Discussion of management or test interpretation with external provider(s): Discussed the case with the patient's surgeon in [facility] as well as their CRNP. Also discussed the case with our local general surgeon. Risk  Prescription drug management. Decision regarding hospitalization. Minor surgery with no identified risk factors. Risk Details: Patient presented to the emergency department and a MSE was performed.  The patient was evaluated and diagnosed with acute right thigh abscess status post surgery. This is a new issue that will require additional planned work-up and treatment in a hospitalized setting. As may have been required as part of this evaluation, clinical laboratory test, radiology imaging and medical testing (I.e. EKG) were ordered as necessitated by the patient's presentation. I independently reviewed these studies, imaging and testing. This patient's case is considered to be a considerable risk secondary to the above listed disease process and poses a threat to the patient's well-being and baseline function. Further in-patient diagnostic testing and management, which may include the administration of parenteral medications, is required. Disposition  Final diagnoses:   Right leg pain   Abscess of right thigh     Time reflects when diagnosis was documented in both MDM as applicable and the Disposition within this note     Time User Action Codes Description Comment    8/9/2023 12:06 PM Amanuel Alex [Q28.945] Right leg pain     8/9/2023 12:06 PM Amanuel Alex [L02.415] Abscess of right thigh       ED Disposition     ED Disposition   Admit    Condition   Stable    Date/Time   Wed Aug 9, 2023 12:06 PM    Comment              Follow-up Information    None         Patient's Medications   Discharge Prescriptions    No medications on file       No discharge procedures on file.     PDMP Review     None          ED Provider  Electronically Signed by           Lilli Hull DO  08/09/23 5950

## 2023-08-09 NOTE — PLAN OF CARE
Problem: PAIN - ADULT  Goal: Verbalizes/displays adequate comfort level or baseline comfort level  Description: Interventions:  - Encourage patient to monitor pain and request assistance  - Assess pain using appropriate pain scale  - Administer analgesics based on type and severity of pain and evaluate response  - Implement non-pharmacological measures as appropriate and evaluate response  - Consider cultural and social influences on pain and pain management  - Notify physician/advanced practitioner if interventions unsuccessful or patient reports new pain  Outcome: Progressing     Problem: INFECTION - ADULT  Goal: Absence or prevention of progression during hospitalization  Description: INTERVENTIONS:  - Assess and monitor for signs and symptoms of infection  - Monitor lab/diagnostic results  - Monitor all insertion sites, i.e. indwelling lines, tubes, and drains  - Monitor endotracheal if appropriate and nasal secretions for changes in amount and color  - [place] appropriate cooling/warming therapies per order  - Administer medications as ordered  - Instruct and encourage patient and family to use good hand hygiene technique  - Identify and instruct in appropriate isolation precautions for identified infection/condition  Outcome: Progressing  Goal: Absence of fever/infection during neutropenic period  Description: INTERVENTIONS:  - Monitor WBC    Outcome: Progressing     Problem: DISCHARGE PLANNING  Goal: Discharge to home or other facility with appropriate resources  Description: INTERVENTIONS:  - Identify barriers to discharge w/patient and caregiver  - Arrange for needed discharge resources and transportation as appropriate  - Identify discharge learning needs (meds, wound care, etc.)  - Arrange for interpretive services to assist at discharge as needed  - Refer to Case Management Department for coordinating discharge planning if the patient needs post-hospital services based on physician/advanced practitioner order or complex needs related to functional status, cognitive ability, or social support system  Outcome: Progressing     Problem: Knowledge Deficit  Goal: Patient/family/caregiver demonstrates understanding of disease process, treatment plan, medications, and discharge instructions  Description: Complete learning assessment and assess knowledge base.   Interventions:  - Provide teaching at level of understanding  - Provide teaching via preferred learning methods  Outcome: Progressing

## 2023-08-09 NOTE — PROCEDURES
Incision and drain    Date/Time: 8/9/2023 2:11 PM    Performed by: Severo Vasquez MD  Authorized by: Severo Vasquez MD  Universal Protocol:  Consent: Written consent obtained. Risks and benefits: risks, benefits and alternatives were discussed  Consent given by: patient  Time out: Immediately prior to procedure a "time out" was called to verify the correct patient, procedure, equipment, support staff and site/side marked as required. Timeout called at: 8/9/2023 1:00 PM.  Patient understanding: patient states understanding of the procedure being performed  Patient identity confirmed: verbally with patient      Patient location:  ED  Location:     Type:  Abscess     and fluid collection    Size:  8 cm    Location:  Lower extremity    Lower extremity location:  R leg  Pre-procedure details:     Skin preparation:  Betadine  Anesthesia (see MAR for exact dosages): Anesthesia method:  Local infiltration    Local anesthetic:  Lidocaine 1% WITH epi  Procedure details:     Complexity:  Simple    Incision types: Other (comment) (opening of fresh incision)    Approach:  Open    Incision depth:  Subfascial    Wound management:  Probed and deloculated    Drainage:  Serosanguinous and purulent    Drainage amount:  Copious    Wound treatment:  Packing placed    Packing material: 5 yds of 1 " packing. Post-procedure details:     Patient tolerance of procedure:   Tolerated well, no immediate complications

## 2023-08-09 NOTE — H&P
H&P Exam - General Surgery   Yasmin Cubao 45 y.o. female MRN: [id number]  Unit/Bed#: ED 02 Encounter: [id number]    Assessment/Plan     Assessment:  The patient is a 40-year-old white female with history of facial paralysis due to Right side Bell's palsy who is status post nerve interposition, and gracilis muscle transfer from her right thigh a week ago at [facility] in [place] Emergency Department. The patient presents to [facility] emergency room with pain and swelling in her right thigh, with purulent drainage and cellulitic changes around the incision use for gracilis harvest.  Patient is also noted to have leukocytosis, and malaise, although she denies fever or shaking chills. CT scan reveals complex rim enhancing fluid collection consistent with abscess of the right medial thigh. She does have tachycardia, and meets the criteria for sepsis present on admission. Plan: The fluid collection was drained at the bedside, and cultures obtained. She will be admitted for intravenous antibiotics. She will require VNA for wound care upon discharge. History of Present Illness   HPI:  Rose Marie Ye is a 45 y.o. 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 Patient states that the close suction drain had been removed prior to her discharge from [facility] in [place] Emergency Department after gracillis transfer to her right face for treatment of facial paralysis due to Bell's palsy. Subsequent to discharge she began noting swelling, pain, spontaneous drainage, warmth and tenderness which was becoming unbearable, leading her to present to the emergency room where CT scan revealed a large complex fluid collection suspicious for abscess.   This was subsequently drained through the surgical incision and packed open with iodoform gauze. Review of Systems   Constitutional: Negative for chills and fever. HENT: Negative for trouble swallowing. Post-operative swelling of Left side of face   Eyes: Negative. Respiratory: Negative. Cardiovascular: Negative. Gastrointestinal: Negative. Genitourinary: Negative. Musculoskeletal: Negative. Skin: Positive for color change and wound. Neurological: Negative. Hematological: Negative. Psychiatric/Behavioral: Negative. Historical Information   Past Medical History:   Diagnosis Date   • Allergic    • Bell's palsy      Past Surgical History:   Procedure Laterality Date   • GRACILLIS MUSCLE TRANSFER AND  STEINDLER FLEXOR PLASTY Right    • NO PAST SURGERIES       Social History   Social History     Substance and Sexual Activity   Alcohol Use Never     Social History     Substance and Sexual Activity   Drug Use Never     Social History     Tobacco Use   Smoking Status Never   Smokeless Tobacco Never     E-Cigarette/Vaping   • E-Cigarette Use Never User      E-Cigarette/Vaping Substances     Family History: History reviewed. No pertinent family history. Meds/Allergies   PTA meds:   Prior to Admission Medications   Prescriptions Last Dose Informant Patient Reported? Taking?    Biotin 1000 MCG tablet   Yes Yes   Sig: Take 1 mg by mouth Three times a day   Multiple Vitamin (MULTIVITAMIN PO)   Yes Yes   Sig: Take 1 tablet by mouth daily   Turmeric 400 MG CAPS   Yes Yes   Sig: Take 1 capsule by mouth daily   cyanocobalamin (VITAMIN B-12) 100 MCG tablet   Yes Yes   Sig: Take 100 mcg by mouth daily   dapagliflozin (Farxiga) 10 MG tablet   Yes Yes   Sig: Take 10 mg by mouth daily   magnesium chloride (MAG64) 64 MG TBEC EC tablet   Yes Yes   Sig: Take 64 mg by mouth in the morning   valsartan (DIOVAN) 80 mg tablet   Yes Yes   Sig: Take 80 mg by mouth daily      Facility-Administered Medications: None     No Known Allergies    Objective   First Vitals:   Blood Pressure: 108/65 (08/09/23 1004)  Pulse: (!) 107 (08/09/23 1004)  Temperature: (!) 97.3 °F (36.3 °C) (08/09/23 1004)  Temp Source: Temporal (08/09/23 1004)  Respirations: 18 (08/09/23 1004)  Height: 5' 11" (180.3 cm) (08/09/23 1016)  Weight - Scale: 98.9 kg (218 lb 0.6 oz) (08/09/23 1004)  SpO2: 98 % (08/09/23 1004)    Current Vitals:   Blood Pressure: 108/65 (08/09/23 1004)  Pulse: 80 (08/09/23 1300)  Temperature: (!) 97.3 °F (36.3 °C) (08/09/23 1004)  Temp Source: Temporal (08/09/23 1004)  Respirations: 18 (08/09/23 1300)  Height: 5' 11" (180.3 cm) (08/09/23 1016)  Weight - Scale: 98.9 kg (218 lb 0.6 oz) (08/09/23 1004)  SpO2: 94 % (08/09/23 1300)    No intake or output data in the 24 hours ending 08/09/23 1341    Invasive Devices     Peripheral Intravenous Line  Duration           Peripheral IV 08/09/23 Left Antecubital <1 day    Peripheral IV 08/09/23 Right Antecubital <1 day                Physical Exam  Vitals reviewed. Constitutional:       General: She is not in acute distress. Appearance: She is ill-appearing. She is not toxic-appearing. HENT:      Head:     Eyes:      General: No scleral icterus. Pupils: Pupils are equal, round, and reactive to light. Cardiovascular:      Rate and Rhythm: Normal rate. Pulmonary:      Effort: Pulmonary effort is normal.   Abdominal:      General: Abdomen is flat. Palpations: Abdomen is soft. Genitourinary:     Comments: deferred  Musculoskeletal:      Cervical back: Neck supple. Legs:    Skin:     General: Skin is warm and dry. Coloration: Skin is not jaundiced. Findings: Erythema (of Right Thigh) present. Neurological:      General: No focal deficit present. Mental Status: She is alert and oriented to person, place, and time. Psychiatric:         Mood and Affect: Mood normal.         Lab Results:   I have personally reviewed pertinent lab results.   , CBC:   Lab Results   Component Value Date    WBC 13.25 (H) 08/09/2023    HGB 11.4 (L) 08/09/2023    HCT 35.4 08/09/2023    MCV 87 08/09/2023     (H) 08/09/2023    RBC 4.09 08/09/2023    MCH 27.9 08/09/2023    MCHC 32.2 08/09/2023    RDW 14.3 08/09/2023    MPV 9.1 08/09/2023    NRBC 0 08/09/2023   , CMP:   Lab Results   Component Value Date    SODIUM 135 08/09/2023    K 3.8 08/09/2023     08/09/2023    CO2 21 08/09/2023    BUN 14 08/09/2023    CREATININE 0.75 08/09/2023    CALCIUM 9.4 08/09/2023    AST 28 08/09/2023    ALT 36 08/09/2023    ALKPHOS 73 08/09/2023    EGFR 90 08/09/2023   , Urinalysis: No results found for: "COLORU", "CLARITYU", "SPECGRAV", "PHUR", "LEUKOCYTESUR", "NITRITE", "PROTEINUA", "GLUCOSEU", "Joao Dine", "BILIRUBINUR", "BLOODU", Lipase: No results found for: "LIPASE"  Imaging: I have personally reviewed pertinent reports. and I have personally reviewed pertinent films in PACS   IMPRESSION:     Rim-enhancing fluid collection in the medial thigh, containing multiple locules of gas in the medial thigh. Findings are concerning for abscess. Postoperative hematoma or seroma not excluded.     The study was marked in EPIC for immediate notification.       EKG, Pathology, and Other Studies: I have personally reviewed pertinent reports. Code Status: Level 1 - Full Code  Advance Directive and Living Will:      Power of :    POLST:      Counseling / Coordination of Care  Total floor / unit time spent today 20 minutes. Greater than 50% of total time was spent with the patient and / or family counseling and / or coordination of care. A description of the counseling / coordination of care: discussion with ER attending, and ENT surgeon at [address] [place] [address], [address] [place] [address].

## 2023-08-10 LAB
BASOPHILS # BLD AUTO: 0.05 THOUSANDS/ÂΜL (ref 0–0.1)
BASOPHILS NFR BLD AUTO: 1 % (ref 0–1)
EOSINOPHIL # BLD AUTO: 0.16 THOUSAND/ÂΜL (ref 0–0.61)
EOSINOPHIL NFR BLD AUTO: 2 % (ref 0–6)
ERYTHROCYTE [DISTWIDTH] IN BLOOD BY AUTOMATED COUNT: 14.3 % (ref 11.6–15.1)
HCT VFR BLD AUTO: 29.1 % (ref 34.8–46.1)
HCT VFR BLD AUTO: 30.5 % (ref 34.8–46.1)
HGB BLD-MCNC: 9.2 G/DL (ref 11.5–15.4)
HGB BLD-MCNC: 9.7 G/DL (ref 11.5–15.4)
IMM GRANULOCYTES # BLD AUTO: 0.14 THOUSAND/UL (ref 0–0.2)
IMM GRANULOCYTES NFR BLD AUTO: 1 % (ref 0–2)
LYMPHOCYTES # BLD AUTO: 1.02 THOUSANDS/ÂΜL (ref 0.6–4.47)
LYMPHOCYTES NFR BLD AUTO: 11 % (ref 14–44)
MCH RBC QN AUTO: 28.1 PG (ref 26.8–34.3)
MCHC RBC AUTO-ENTMCNC: 31.8 G/DL (ref 31.4–37.4)
MCV RBC AUTO: 88 FL (ref 82–98)
MONOCYTES # BLD AUTO: 1.24 THOUSAND/ÂΜL (ref 0.17–1.22)
MONOCYTES NFR BLD AUTO: 13 % (ref 4–12)
NEUTROPHILS # BLD AUTO: 7.14 THOUSANDS/ÂΜL (ref 1.85–7.62)
NEUTS SEG NFR BLD AUTO: 72 % (ref 43–75)
NRBC BLD AUTO-RTO: 0 /100 WBCS
PLATELET # BLD AUTO: 334 THOUSANDS/UL (ref 149–390)
PMV BLD AUTO: 9.1 FL (ref 8.9–12.7)
RBC # BLD AUTO: 3.45 MILLION/UL (ref 3.81–5.12)
WBC # BLD AUTO: 9.75 THOUSAND/UL (ref 4.31–10.16)

## 2023-08-10 PROCEDURE — 85025 COMPLETE CBC W/AUTO DIFF WBC: CPT | Performed by: SPECIALIST

## 2023-08-10 PROCEDURE — 85014 HEMATOCRIT: CPT

## 2023-08-10 PROCEDURE — 99024 POSTOP FOLLOW-UP VISIT: CPT | Performed by: PHYSICIAN ASSISTANT

## 2023-08-10 PROCEDURE — 85018 HEMOGLOBIN: CPT

## 2023-08-10 RX ORDER — HYDROMORPHONE HCL IN WATER/PF 6 MG/30 ML
0.2 PATIENT CONTROLLED ANALGESIA SYRINGE INTRAVENOUS EVERY 2 HOUR PRN
Status: DISCONTINUED | OUTPATIENT
Start: 2023-08-10 | End: 2023-08-12 | Stop reason: HOSPADM

## 2023-08-10 RX ADMIN — HYDROCODONE BITARTRATE AND ACETAMINOPHEN 1 TABLET: 5; 325 TABLET ORAL at 05:48

## 2023-08-10 RX ADMIN — HYDROCODONE BITARTRATE AND ACETAMINOPHEN 1 TABLET: 5; 325 TABLET ORAL at 14:24

## 2023-08-10 RX ADMIN — VITAM B12 100 MCG: 100 TAB at 09:06

## 2023-08-10 RX ADMIN — CEFAZOLIN SODIUM 2000 MG: 2 SOLUTION INTRAVENOUS at 05:48

## 2023-08-10 RX ADMIN — CEFAZOLIN SODIUM 2000 MG: 2 SOLUTION INTRAVENOUS at 14:15

## 2023-08-10 RX ADMIN — SODIUM CHLORIDE, SODIUM LACTATE, POTASSIUM CHLORIDE, AND CALCIUM CHLORIDE 150 ML/HR: .6; .31; .03; .02 INJECTION, SOLUTION INTRAVENOUS at 05:48

## 2023-08-10 RX ADMIN — DAPAGLIFLOZIN 10 MG: 10 TABLET, FILM COATED ORAL at 10:59

## 2023-08-10 RX ADMIN — MAGNESIUM 64 MG (MAGNESIUM CHLORIDE) TABLET,DELAYED RELEASE 64 MG: at 11:01

## 2023-08-10 RX ADMIN — ENOXAPARIN SODIUM 40 MG: 40 INJECTION SUBCUTANEOUS at 09:06

## 2023-08-10 RX ADMIN — HYDROMORPHONE HYDROCHLORIDE 0.2 MG: 0.2 INJECTION, SOLUTION INTRAMUSCULAR; INTRAVENOUS; SUBCUTANEOUS at 10:46

## 2023-08-10 RX ADMIN — Medication 1 TABLET: at 09:06

## 2023-08-10 RX ADMIN — CEFAZOLIN SODIUM 2000 MG: 2 SOLUTION INTRAVENOUS at 22:31

## 2023-08-10 NOTE — PLAN OF CARE
Problem: PAIN - ADULT  Goal: Verbalizes/displays adequate comfort level or baseline comfort level  Description: Interventions:  - Encourage patient to monitor pain and request assistance  - Assess pain using appropriate pain scale  - Administer analgesics based on type and severity of pain and evaluate response  - Implement non-pharmacological measures as appropriate and evaluate response  - Consider cultural and social influences on pain and pain management  - Notify physician/advanced practitioner if interventions unsuccessful or patient reports new pain  Outcome: Progressing     Problem: INFECTION - ADULT  Goal: Absence or prevention of progression during hospitalization  Description: INTERVENTIONS:  - Assess and monitor for signs and symptoms of infection  - Monitor lab/diagnostic results  - Monitor all insertion sites, i.e. indwelling lines, tubes, and drains  - Monitor endotracheal if appropriate and nasal secretions for changes in amount and color  - [place] appropriate cooling/warming therapies per order  - Administer medications as ordered  - Instruct and encourage patient and family to use good hand hygiene technique  - Identify and instruct in appropriate isolation precautions for identified infection/condition  Outcome: Progressing  Goal: Absence of fever/infection during neutropenic period  Description: INTERVENTIONS:  - Monitor WBC    Outcome: Progressing     Problem: DISCHARGE PLANNING  Goal: Discharge to home or other facility with appropriate resources  Description: INTERVENTIONS:  - Identify barriers to discharge w/patient and caregiver  - Arrange for needed discharge resources and transportation as appropriate  - Identify discharge learning needs (meds, wound care, etc.)  - Arrange for interpretive services to assist at discharge as needed  - Refer to Case Management Department for coordinating discharge planning if the patient needs post-hospital services based on physician/advanced practitioner order or complex needs related to functional status, cognitive ability, or social support system  Outcome: Progressing     Problem: Knowledge Deficit  Goal: Patient/family/caregiver demonstrates understanding of disease process, treatment plan, medications, and discharge instructions  Description: Complete learning assessment and assess knowledge base.   Interventions:  - Provide teaching at level of understanding  - Provide teaching via preferred learning methods  Outcome: Progressing     Problem: SKIN/TISSUE INTEGRITY - ADULT  Goal: Skin Integrity remains intact(Skin Breakdown Prevention)  Description: Assess:  -Perform Micah assessment every shift  -Clean and moisturize skin every shift and PRN  -Inspect skin when repositioning, toileting, and assisting with ADLS  -Assess extremities for adequate circulation and sensation     Bed Management:  -Have minimal linens on bed & keep smooth, unwrinkled  -Change linens as needed when moist or perspiring    Activity:  -Mobilize patient 3 times a day  -Encourage activity and walks on unit  -Encourage or provide ROM exercises   -Use appropriate equipment to lift or move patient in bed    Skin Care:  -Avoid use of baby powder, tape, friction and shearing, hot water or constrictive clothing  -Do not massage red bony areas    Outcome: Progressing  Goal: Incision(s), wounds(s) or drain site(s) healing without S/S of infection  Description: INTERVENTIONS  - Assess and document dressing, incision, wound bed, drain sites and surrounding tissue  - Provide patient and family education  - Perform skin care/dressing changes every day and PRN  Outcome: Progressing     Problem: HEMATOLOGIC - ADULT  Goal: Maintains hematologic stability  Description: INTERVENTIONS  - Assess for signs and symptoms of bleeding or hemorrhage  - Monitor labs  - Administer supportive blood products/factors as ordered and appropriate  Outcome: Progressing

## 2023-08-10 NOTE — UTILIZATION REVIEW
Initial Clinical Review    Admission: Date/Time/Statement:   Admission Orders (From admission, onward)     Ordered        08/09/23 1328  INPATIENT ADMISSION  Once                      Orders Placed This Encounter   Procedures   • INPATIENT ADMISSION     Standing Status:   Standing     Number of Occurrences:   1     Order Specific Question:   Level of Care     Answer:   Med Surg [16]     Order Specific Question:   Estimated length of stay     Answer:   More than 2 Midnights     Order Specific Question:   Certification     Answer:   I certify that inpatient services are medically necessary for this patient for a duration of greater than two midnights. See H&P and MD Progress Notes for additional information about the patient's course of treatment. ED Arrival Information     Expected   -    Arrival   8/9/2023 09:54    Acuity   Urgent            Means of arrival   Walk-In    Escorted by   Family Member    Service   Surgery-General    Admission type   Emergency            Arrival complaint   wound re-check,leg pain           Chief Complaint   Patient presents with   • Leg Pain     Had surgery on her leg 31st where she had part of a muscle taken from her leg and put in her face. Pain started Sunday and took pain medication Monday, yesterday received Abx and told to see PCP if pain increases or fever. PCP told her to come to ED. Initial Presentation: 45 y.o. female to ED via walk-in from home  Presents with history of facial paralysis due to Right side Bell's palsy who is status post nerve interposition, and gracilis muscle transfer from her right thigh a week ago at [facility].   The patient presents to [address] [place] [address] emergency room with pain and swelling in her right thigh, with purulent drainage and cellulitic changes around the incision use for gracilis harvest.  PMHX IIa ER, VA negative, HER2 positive, infiltrating ductal carcinoma of the left breast hypertension,, status postlumpectomy and chemoradiation, HTN   Admitted to [facility] with DX: Sepsis  on exam: tachy; Erythema (of Right Thigh);  leukocytosis, and malaise  CT scan reveals complex rim enhancing fluid collection consistent with abscess of the right medial thigh. PLAN: The fluid collection was drained at the bedside, and cultures obtained. Cont iv abx; cont ivf; monitor labs; pain control      Date: 8/10/23   Day 2  POD1 s/p incision and drainage of right inner thigh fluid collection  Continues with erythema surrounding wound and pain - pain 5-7/10; wound is packed with iodoform, the outer dressing needed changed several times overnight and this am due to saturation with serous fluid; Hgb with notable drop 9.7 (11.4) - recheck ordered;  Wound culture in progress 2+ polys, 2+ GPC in pairs  Plan: cont iv abx; cont ivf; monitor labs - re-check Heme at 1 pm; pain control; f/u blood / wound cx; cont daily packing of wound - 1 inch packing, covered with 4x4, ABD taped over; CM consulted for [facility]    ED Triage Vitals [08/09/23 1004]   Temperature Pulse Respirations Blood Pressure SpO2   (!) 97.3 °F (36.3 °C) (!) 107 18 108/65 98 %      Temp Source Heart Rate Source Patient Position - Orthostatic VS BP Location FiO2 (%)   Temporal Monitor Sitting Right arm --      Pain Score       9          Wt Readings from Last 1 Encounters:   08/09/23 98.9 kg (218 lb 0.6 oz)     Additional Vital Signs:   Date/Time Temp Pulse Resp BP MAP (mmHg) SpO2 O2 Device Patient Position - Orthostatic VS   08/10/23 09:06:17 -- 94 -- 99/63  75 96 % None (Room air) Lying   BP: asymptomatic at 08/10/23 0906   08/10/23 06:58:30 97.5 °F (36.4 °C) 89 19 111/67 82 96 % -- --   08/09/23 22:50:25 98.8 °F (37.1 °C) 93 14 105/58 74 97 % -- --   08/09/23 1427 -- -- -- -- -- -- None (Room air) --   08/09/23 14:21:13 98.4 °F (36.9 °C) 92 19 107/73 84 96 % -- --   08/09/23 1300 -- 80 18 108/51 -- 94 % None (Room air) Lying   08/09/23 1100 -- 89 14 -- -- 93 % -- --   08/09/23 1019 -- -- -- -- -- -- None (Room air) --   08/09/23 1004 97.3 °F (36.3 °C) Abnormal  107 Abnormal  18 108/65 -- 98 % None (Room air) Sitting           EKG: Normal sinus rhythm  Normal ECG  No previous ECGs available    Pertinent Labs/Diagnostic Test Results:   CT lower extremity w contrast right   Final Result by Fred Barney DO (08/09 1145)      Rim-enhancing fluid collection in the medial thigh, containing multiple locules of gas in the medial thigh. Findings are concerning for abscess. Postoperative hematoma or seroma not excluded. The study was marked in [facility] for immediate notification. Workstation performed: [id number]         XR chest portable   ED Interpretation by Octavia Fonseca DO (08/09 1101)   No active disease      Final Result by Opal Naidu MD (08/09 1113)      No acute cardiopulmonary disease.                   Workstation performed: [id number]               Results from last 7 days   Lab Units 08/10/23  0551 08/09/23  1021   WBC Thousand/uL 9.75 13.25*   HEMOGLOBIN g/dL 9.7* 11.4*   HEMATOCRIT % 30.5* 35.4   PLATELETS Thousands/uL 334 406*   NEUTROS ABS Thousands/µL 7.14 10.74*         Results from last 7 days   Lab Units 08/09/23  1021   SODIUM mmol/L 135   POTASSIUM mmol/L 3.8   CHLORIDE mmol/L 103   CO2 mmol/L 21   ANION GAP mmol/L 11   BUN mg/dL 14   CREATININE mg/dL 0.75   EGFR ml/min/1.73sq m 90   CALCIUM mg/dL 9.4     Results from last 7 days   Lab Units 08/09/23  1021   AST U/L 28   ALT U/L 36   ALK PHOS U/L 73   TOTAL PROTEIN g/dL 7.8   ALBUMIN g/dL 4.0   TOTAL BILIRUBIN mg/dL 1.46*         Results from last 7 days   Lab Units 08/09/23  1021   GLUCOSE RANDOM mg/dL 123       Results from last 7 days   Lab Units 08/09/23  1021   CK TOTAL U/L 127       Results from last 7 days   Lab Units 08/09/23  1021   PROTIME seconds 12.7   INR  0.95   PTT seconds 26         Results from last 7 days   Lab Units 08/09/23  1021   PROCALCITONIN ng/ml 0.17     Results from last 7 days Lab Units 08/09/23  1021   LACTIC ACID mmol/L 1.1       Results from last 7 days   Lab Units 08/09/23  1303 08/09/23  1021   BLOOD CULTURE   --  Received in Microbiology Lab. Culture in Progress. Received in Microbiology Lab. Culture in Progress. GRAM STAIN RESULT  2+ Polys*  2+ Gram positive cocci in pairs*  --      ED Treatment:   Medication Administration from 08/09/2023 0952 to 08/09/2023 1413       Date/Time Order Dose Route Action     08/09/2023 1027 EDT morphine injection 4 mg 4 mg Intravenous Given     08/09/2023 1037 EDT ampicillin-sulbactam (UNASYN) 3 g in sodium chloride 0.9 % 100 mL IVPB 3 g Intravenous New Bag     08/09/2023 1029 EDT lactated ringers bolus 2,000 mL 2,000 mL Intravenous New Bag     08/09/2023 1030 EDT lactated ringers infusion 150 mL/hr Intravenous New Bag     08/09/2023 1131 EDT iohexol (OMNIPAQUE) 350 MG/ML injection (SINGLE-DOSE) 100 mL 100 mL Intravenous Given          Present on Admission:  • Sepsis [facility])      Admitting Diagnosis: Leg pain [M79.606]  Right leg pain [M79.604]  Abscess of right thigh [L02.415]     Age/Sex: 45 y.o. female     Admission Orders: SCDs; I/O; regular diet    Scheduled Medications:  cefazolin, 2,000 mg, Intravenous, Q8H  cyanocobalamin, 100 mcg, Oral, Daily  dapagliflozin, 10 mg, Oral, Daily  enoxaparin, 40 mg, Subcutaneous, Daily  losartan, 50 mg, Oral, Daily  magnesium chloride, 64 mg, Oral, Daily  multivitamin-minerals, 1 tablet, Oral, Daily      Continuous IV Infusions: lactated ringers infusion Rate: 150 mL/hr       PRN Meds:  HYDROcodone-acetaminophen, 1 tablet, Oral, Q6H PRN  (8/9 rec'd x2)  (8/10 rec'd x1 so far today)   HYDROmorphone, 0.2 mg, Intravenous, Q2H PRN  (8/10 rec'd x1 so far today)         IP CONSULT TO CASE MANAGEMENT    Network Utilization Review Department  ATTENTION: Please call with any questions or concerns to [phone] and carefully listen to the prompts so that you are directed to the right person.  All voicemails are confidential.  Janet Oconnell all requests for admission clinical reviews, approved or denied determinations and any other requests to dedicated fax number below belonging to the campus where the patient is receiving treatment.  List of dedicated fax numbers for the Facilities:  [facility] MEJIA (Administrative/Medical Necessity) [phone] [address] (Maternity/NICU/Pediatrics) [phone]   [address] [phone]   [facility] [address] [facility] [phone]   [address] [place] [address] [address] [place] [address] [id number] [place] [address] [phone]   [address] [phone]

## 2023-08-10 NOTE — PROGRESS NOTES
Progress Note - General Surgery   Koki Allison 45 y.o. female MRN: [id number]  Unit/Bed#: -01 Encounter: [id number]    Assessment:  45 y.o. female POD1 s/p incision and drainage of right inner thigh fluid collection  -the pt's wound is packed with iodoform, the outer dressing needed changed several times overnight and this am due to saturation with serous fluid  -exam shows no change in erythema surrounding wound and extending into upper right thigh  - Afebrile, VSS on room air  - WBC 9 (13)  - Hgb with notable drop 9.7 (11.4) - recheck ordered  - Blood cultures in progress  - Wound culture in progress 2+ polys, 2+ GPC in pairs  - Hx significant for facial paralysis due to right sided bell's palsy, s/p nerve interposition, gracilis muscle transfer from right thigh 7/[facility] and [facility]   - 10/2021-Stage IIA (T2m,N0,M0,G3),ER-,WV-,HER+,IDC Left breast s/p lumpectomy and chemoradiation     Plan:  - Regular diet as tolerated  - Discontinue IVF  - Continue daily packing of wound with 1 inch packing, covered with 4x4, ABD taped over  - Exchange outside dressing PRN saturation  - IV abx - Ancef   - Recheck Hgb at 1PM  - Follow up wound culture, blood cultures  - Pain control PRN  - Appreciate case management assistance with [facility] for patient     Subjective: She continues to have pain surrounding her right upper thigh incision which extends at times into her low back, though states it is slightly less than yesterday. Denies F/C. Tolerating a regular diet. Objective:   Blood pressure 111/67, pulse 89, temperature 97.5 °F (36.4 °C), resp. rate 19, height 5' 11" (1.803 m), weight 98.9 kg (218 lb 0.6 oz), SpO2 96 %. ,Body mass index is 30.41 kg/m².     Intake/Output Summary (Last 24 hours) at 8/10/2023 0803  Last data filed at 8/10/2023 0054  Gross per 24 hour   Intake --   Output 500 ml   Net -500 ml     Invasive Devices     Peripheral Intravenous Line  Duration           Peripheral IV 08/09/23 Left Antecubital <1 day    Peripheral IV 08/09/23 Right Antecubital <1 day              Physical Exam:  General: no acute distress, pt appears well and comfortable, resting in bed  Skin: warm and dry to touch  Head: post-operative edema and partial paralysis   Pulmonary: normal effort  Musculoskeletal: right lower extremity - upper medial thigh, no change noted to erythema surrounding prior incision site with some extension superiorly into anterior thigh. The pt was given IV pain medication prior to packing change. Packing removed, cloudy purulent-looking thin fluid noted to be saturating packing and outer dressing. Wound irrigated with 10cc of sterile saline. No bleeding noted. Wound was repacked with 1" iodoform gauze through a small opening in the distal portion of the incision. FAROM all extremities with no peripheral edema and good strength  Neuro: alert and oriented     Lab, Imaging and other studies:  I have personally reviewed pertinent lab results.   , CBC:   Lab Results   Component Value Date    WBC 9.75 08/10/2023    HGB 9.7 (L) 08/10/2023    HCT 30.5 (L) 08/10/2023    MCV 88 08/10/2023     08/10/2023    RBC 3.45 (L) 08/10/2023    MCH 28.1 08/10/2023    MCHC 31.8 08/10/2023    RDW 14.3 08/10/2023    MPV 9.1 08/10/2023    NRBC 0 08/10/2023   , CMP:   Lab Results   Component Value Date    SODIUM 135 08/09/2023    K 3.8 08/09/2023     08/09/2023    CO2 21 08/09/2023    BUN 14 08/09/2023    CREATININE 0.75 08/09/2023    CALCIUM 9.4 08/09/2023    AST 28 08/09/2023    ALT 36 08/09/2023    ALKPHOS 73 08/09/2023    EGFR 90 08/09/2023     VTE Pharmacologic Prophylaxis: Enoxaparin (Lovenox)  VTE Mechanical Prophylaxis: sequential compression device    Kelli Lozano PA-C  8/10/2023

## 2023-08-10 NOTE — PLAN OF CARE
Problem: PAIN - ADULT  Goal: Verbalizes/displays adequate comfort level or baseline comfort level  Description: Interventions:  - Encourage patient to monitor pain and request assistance  - Assess pain using appropriate pain scale  - Administer analgesics based on type and severity of pain and evaluate response  - Implement non-pharmacological measures as appropriate and evaluate response  - Consider cultural and social influences on pain and pain management  - Notify physician/advanced practitioner if interventions unsuccessful or patient reports new pain  Outcome: Progressing     Problem: INFECTION - ADULT  Goal: Absence or prevention of progression during hospitalization  Description: INTERVENTIONS:  - Assess and monitor for signs and symptoms of infection  - Monitor lab/diagnostic results  - Monitor all insertion sites, i.e. indwelling lines, tubes, and drains  - Monitor endotracheal if appropriate and nasal secretions for changes in amount and color  - [place] appropriate cooling/warming therapies per order  - Administer medications as ordered  - Instruct and encourage patient and family to use good hand hygiene technique  - Identify and instruct in appropriate isolation precautions for identified infection/condition  Outcome: Progressing  Goal: Absence of fever/infection during neutropenic period  Description: INTERVENTIONS:  - Monitor WBC    Outcome: Progressing     Problem: DISCHARGE PLANNING  Goal: Discharge to home or other facility with appropriate resources  Description: INTERVENTIONS:  - Identify barriers to discharge w/patient and caregiver  - Arrange for needed discharge resources and transportation as appropriate  - Identify discharge learning needs (meds, wound care, etc.)  - Arrange for interpretive services to assist at discharge as needed  - Refer to Case Management Department for coordinating discharge planning if the patient needs post-hospital services based on physician/advanced practitioner order or complex needs related to functional status, cognitive ability, or social support system  Outcome: Progressing     Problem: Knowledge Deficit  Goal: Patient/family/caregiver demonstrates understanding of disease process, treatment plan, medications, and discharge instructions  Description: Complete learning assessment and assess knowledge base.   Interventions:  - Provide teaching at level of understanding  - Provide teaching via preferred learning methods  Outcome: Progressing

## 2023-08-11 LAB
BACTERIA WND AEROBE CULT: ABNORMAL
BASOPHILS # BLD MANUAL: 0.17 THOUSAND/UL (ref 0–0.1)
BASOPHILS NFR MAR MANUAL: 2 % (ref 0–1)
EOSINOPHIL # BLD MANUAL: 0.26 THOUSAND/UL (ref 0–0.4)
EOSINOPHIL NFR BLD MANUAL: 3 % (ref 0–6)
ERYTHROCYTE [DISTWIDTH] IN BLOOD BY AUTOMATED COUNT: 13.9 % (ref 11.6–15.1)
GRAM STN SPEC: ABNORMAL
GRAM STN SPEC: ABNORMAL
HCT VFR BLD AUTO: 31.1 % (ref 34.8–46.1)
HGB BLD-MCNC: 10.1 G/DL (ref 11.5–15.4)
LYMPHOCYTES # BLD AUTO: 0.85 THOUSAND/UL (ref 0.6–4.47)
LYMPHOCYTES # BLD AUTO: 10 % (ref 14–44)
MCH RBC QN AUTO: 28.1 PG (ref 26.8–34.3)
MCHC RBC AUTO-ENTMCNC: 32.5 G/DL (ref 31.4–37.4)
MCV RBC AUTO: 87 FL (ref 82–98)
METAMYELOCYTES NFR BLD MANUAL: 1 % (ref 0–1)
MONOCYTES # BLD AUTO: 0.6 THOUSAND/UL (ref 0–1.22)
MONOCYTES NFR BLD: 7 % (ref 4–12)
NEUTROPHILS # BLD MANUAL: 6.55 THOUSAND/UL (ref 1.85–7.62)
NEUTS SEG NFR BLD AUTO: 77 % (ref 43–75)
PLATELET # BLD AUTO: 366 THOUSANDS/UL (ref 149–390)
PLATELET BLD QL SMEAR: ADEQUATE
PMV BLD AUTO: 8.9 FL (ref 8.9–12.7)
POLYCHROMASIA BLD QL SMEAR: PRESENT
RBC # BLD AUTO: 3.59 MILLION/UL (ref 3.81–5.12)
RBC MORPH BLD: PRESENT
WBC # BLD AUTO: 8.5 THOUSAND/UL (ref 4.31–10.16)

## 2023-08-11 PROCEDURE — 85007 BL SMEAR W/DIFF WBC COUNT: CPT | Performed by: SPECIALIST

## 2023-08-11 PROCEDURE — 99024 POSTOP FOLLOW-UP VISIT: CPT | Performed by: SPECIALIST

## 2023-08-11 PROCEDURE — 85027 COMPLETE CBC AUTOMATED: CPT | Performed by: SPECIALIST

## 2023-08-11 RX ORDER — CEPHALEXIN 500 MG/1
500 CAPSULE ORAL EVERY 6 HOURS SCHEDULED
Status: DISCONTINUED | OUTPATIENT
Start: 2023-08-11 | End: 2023-08-12 | Stop reason: HOSPADM

## 2023-08-11 RX ORDER — ACETAMINOPHEN 325 MG/1
650 TABLET ORAL EVERY 6 HOURS PRN
Status: DISCONTINUED | OUTPATIENT
Start: 2023-08-11 | End: 2023-08-12 | Stop reason: HOSPADM

## 2023-08-11 RX ORDER — OXYCODONE HYDROCHLORIDE 5 MG/1
5 TABLET ORAL EVERY 4 HOURS PRN
Status: DISCONTINUED | OUTPATIENT
Start: 2023-08-11 | End: 2023-08-12 | Stop reason: HOSPADM

## 2023-08-11 RX ADMIN — MAGNESIUM 64 MG (MAGNESIUM CHLORIDE) TABLET,DELAYED RELEASE 64 MG: at 08:05

## 2023-08-11 RX ADMIN — CEFAZOLIN SODIUM 2000 MG: 2 SOLUTION INTRAVENOUS at 06:12

## 2023-08-11 RX ADMIN — DAPAGLIFLOZIN 10 MG: 10 TABLET, FILM COATED ORAL at 08:04

## 2023-08-11 RX ADMIN — HYDROCODONE BITARTRATE AND ACETAMINOPHEN 1 TABLET: 5; 325 TABLET ORAL at 15:13

## 2023-08-11 RX ADMIN — ENOXAPARIN SODIUM 40 MG: 40 INJECTION SUBCUTANEOUS at 08:00

## 2023-08-11 RX ADMIN — Medication 1 TABLET: at 08:04

## 2023-08-11 RX ADMIN — ACETAMINOPHEN 650 MG: 325 TABLET, FILM COATED ORAL at 22:21

## 2023-08-11 RX ADMIN — LOSARTAN POTASSIUM 50 MG: 50 TABLET, FILM COATED ORAL at 08:04

## 2023-08-11 RX ADMIN — CEPHALEXIN 500 MG: 500 CAPSULE ORAL at 19:14

## 2023-08-11 RX ADMIN — CEFAZOLIN SODIUM 2000 MG: 2 SOLUTION INTRAVENOUS at 14:28

## 2023-08-11 RX ADMIN — VITAM B12 100 MCG: 100 TAB at 08:04

## 2023-08-11 RX ADMIN — CEPHALEXIN 500 MG: 500 CAPSULE ORAL at 23:51

## 2023-08-11 NOTE — UTILIZATION REVIEW
Continued Stay Review    Date: 8/11/23                          Current Patient Class: IP  Current Level of Care: MS    HPI:52 y.o. female initially admitted on 8/9/23 - DX: Sepsis     Assessment/Plan:   8/11/23: POD # 2 s/p bedside I & D of Rt thigh abscess  Physical Exam: Rt Thigh: Iodoform gauze packing changed, irrigated with 30 cc of NS, output appears less turbid serosanguinous, with flecks of surgicel with irrigation. Erythema fading, remains indurated.  Culture +3 Staph aureus, sensitivities pendin  Plan: cont iv abx; monitor labs; pain control; f/u blood / wound cx; irrigate wound with saline and cont daily packing of wound - 1 inch packing, covered with 4x4, ABD taped over; CM consulted for [facility]    Vital Signs: /83   Pulse 76   Temp 97.7 °F (36.5 °C)   Resp 19   Ht 5' 11" (1.803 m)   Wt 98.9 kg (218 lb 0.6 oz)   SpO2 98%   BMI 30.41 kg/m²       Pertinent Labs/Diagnostic Results:     Results from last 7 days   Lab Units 08/11/23  0756 08/10/23  1323 08/10/23  0551 08/09/23  1021   WBC Thousand/uL 8.50  --  9.75 13.25*   HEMOGLOBIN g/dL 10.1* 9.2* 9.7* 11.4*   HEMATOCRIT % 31.1* 29.1* 30.5* 35.4   PLATELETS Thousands/uL 366  --  334 406*   NEUTROS ABS Thousands/µL  --   --  7.14 10.74*       Results from last 7 days   Lab Units 08/09/23  1021   SODIUM mmol/L 135   POTASSIUM mmol/L 3.8   CHLORIDE mmol/L 103   CO2 mmol/L 21   ANION GAP mmol/L 11   BUN mg/dL 14   CREATININE mg/dL 0.75   EGFR ml/min/1.73sq m 90   CALCIUM mg/dL 9.4     Results from last 7 days   Lab Units 08/09/23  1021   AST U/L 28   ALT U/L 36   ALK PHOS U/L 73   TOTAL PROTEIN g/dL 7.8   ALBUMIN g/dL 4.0   TOTAL BILIRUBIN mg/dL 1.46*       Results from last 7 days   Lab Units 08/09/23  1021   GLUCOSE RANDOM mg/dL 123       Results from last 7 days   Lab Units 08/09/23  1021   CK TOTAL U/L 127       Results from last 7 days   Lab Units 08/09/23  1021   PROTIME seconds 12.7   INR  0.95   PTT seconds 26       Results from last 7 days Lab Units 08/09/23  1021   PROCALCITONIN ng/ml 0.17     Results from last 7 days   Lab Units 08/09/23  1021   LACTIC ACID mmol/L 1.1       Results from last 7 days   Lab Units 08/09/23  1303 08/09/23  1021   BLOOD CULTURE   --  No Growth at 24 hrs. No Growth at 24 hrs. GRAM STAIN RESULT  2+ Polys*  2+ Gram positive cocci in pairs*  --    WOUND CULTURE  3+ Growth of Staphylococcus aureus*  --        Medications:   Scheduled Medications:  cefazolin, 2,000 mg, Intravenous, Q8H  cyanocobalamin, 100 mcg, Oral, Daily  dapagliflozin, 10 mg, Oral, Daily  enoxaparin, 40 mg, Subcutaneous, Daily  losartan, 50 mg, Oral, Daily  magnesium chloride, 64 mg, Oral, Daily  multivitamin-minerals, 1 tablet, Oral, Daily      Continuous IV Infusions: none     PRN Meds:  HYDROcodone-acetaminophen, 1 tablet, Oral, Q6H PRN  HYDROmorphone, 0.2 mg, Intravenous, Q2H PRN        Discharge Plan: [facility]    Network Utilization Review Department  ATTENTION: Please call with any questions or concerns to [phone] and carefully listen to the prompts so that you are directed to the right person. All voicemails are confidential.  Ander Castellanos all requests for admission clinical reviews, approved or denied determinations and any other requests to dedicated fax number below belonging to the campus where the patient is receiving treatment.  List of dedicated fax numbers for the Facilities:  [place] DENIALS (Administrative/Medical Necessity) [phone] [address] (Maternity/NICU/Pediatrics) [phone]   [address] [place] [address] [address]  [facility] [phone]

## 2023-08-11 NOTE — PROGRESS NOTES
Progress Note - General Surgery   Abdoulaye Ace 45 y.o. female MRN: [id number]  Unit/Bed#: -Sakshi Encounter: [id number]    Assessment:  POD 11 from Rt Gracilis harvest (at [address])  48 hrs out from bedside I & D of Rt thigh abscess  Reports less pain, no fever chills  VSS AF  WBC 8.5, H& H 10.1 & 31.1  Culture +3 Staph aureus, sensitivities pendin  Erythema fading, drainage more serous in character    Plan:  Continue Ancef until sensitivities available  Irrigated with saline, repacked with iodoform gauze, appears cavity is collapsing  Home with VNA for dressing changes when transitioned to PO abx. May ultimately require wound VAC for management of post op seroma    Subjective/Objective   Chief Complaint: "I feel better"    Subjective: in good humor, watching TV    Objective:     Blood pressure 122/83, pulse 76, temperature 97.7 °F (36.5 °C), resp. rate 19, height 5' 11" (1.803 m), weight 98.9 kg (218 lb 0.6 oz), SpO2 98 %. ,Body mass index is 30.41 kg/m². Intake/Output Summary (Last 24 hours) at 8/11/2023 0946  Last data filed at 8/11/2023 0900  Gross per 24 hour   Intake 640 ml   Output 1500 ml   Net -860 ml       Invasive Devices     Peripheral Intravenous Line  Duration           Peripheral IV 08/09/23 Left Antecubital 1 day    Peripheral IV 08/09/23 Right Antecubital 1 day                Physical Exam:   Rt Thigh: Iodoform gauze packing changed, irrigated with 30 cc of NS, output appears less turbid serosanguinous, with flecks of surgicel with irrigation. Erythema fading, remains indurated.     Lab, Imaging and other studies:  CBC:   Lab Results   Component Value Date    WBC 8.50 08/11/2023    HGB 10.1 (L) 08/11/2023    HCT 31.1 (L) 08/11/2023    MCV 87 08/11/2023     08/11/2023    RBC 3.59 (L) 08/11/2023    MCH 28.1 08/11/2023    MCHC 32.5 08/11/2023    RDW 13.9 08/11/2023    MPV 8.9 08/11/2023     VTE Pharmacologic Prophylaxis: Enoxaparin (Lovenox)  VTE Mechanical Prophylaxis: sequential compression device

## 2023-08-11 NOTE — CASE MANAGEMENT
Case Management Assessment & Discharge Planning Note    Patient name Zan Leyden  Location /-01 MRN [id number]  : 1970 Date 2023       Current Admission Date: 2023  Current Admission Diagnosis:Sepsis [facility])  Patient Active Problem List    Diagnosis Date Noted   • Sepsis ([address]) 2023   • Status post partial mastectomy of left breast 07/15/2022   • Dilated cardiomyopathy ([address]) 2021   • Essential hypertension 2021   • Malignant neoplasm of upper-inner quadrant of left breast in female, estrogen receptor negative ([address]) 10/19/2021      LOS (days): 2  Geometric Mean LOS (GMLOS) (days):   Days to GMLOS:     OBJECTIVE:    Risk of Unplanned Readmission Score: 11.41     CM met with patient at the bedside,baseline information was obtained. CM discussed the role of CM in helping the patient develop a discharge plan and assist the patient in carry out their plan. Current admission status: Inpatient  Referral Reason: Other (VNA for daily dressing changes, repack wound with iodoform)    Preferred Pharmacy:   [address] [place] [address]  Phone: [phone] Fax: [phone]    Primary Care Provider: Sandra Vogel MD    Primary Insurance: [facility]  Secondary Insurance:     ASSESSMENT:  [facility] Proxies    There are no active Health Care Proxies on file. Advance Directives  Does patient have a [address]?: No  Was patient offered paperwork?: Yes  Does patient have Advance Directives?: No  Was patient offered paperwork?: Yes              Patient Information  Admitted from[de-identified] Home  Mental Status: Alert  During Assessment patient was accompanied by: Not accompanied during assessment  Assessment information provided by[de-identified] Patient  Primary Caregiver: Self  Support Systems: [address] [place] [address] Road of Residence: [address] [place] [address]. do you live in?: [address] [place] [address] entry access options.  Select all that apply.: No steps to enter home  Type of Current Residence: 2 story home  Upon entering residence, is there a bedroom on the main floor (no further steps)?: No  A bedroom is located on the following floor levels of residence (select all that apply):: 2nd Floor  Upon entering residence, is there a bathroom on the main floor (no further steps)?: Yes  Number of steps to 2nd floor from main floor: One Flight  In the last 12 months, was there a time when you were not able to pay the mortgage or rent on time?: No  In the last 12 months, how many places have you lived?: 1  In the last 12 months, was there a time when you did not have a steady place to sleep or slept in a shelter (including now)?: No  Homeless/housing insecurity resource given?: N/A  Living Arrangements: Lives w/ Daughter  Is patient a ?: No    Activities of Daily Living Prior to Admission  Functional Status: Independent  Completes ADLs independently?: Yes  Ambulates independently?: Yes  Does patient use assisted devices?: No  Does patient currently own DME?: No  Does patient have a history of Outpatient Therapy (PT/OT)?: Yes  Does the patient have a history of Short-Term Rehab?: No  Does patient have a history of HHC?: No  Does patient currently have [facility]?: No         Patient Information Continued  Income Source: Employed  Does patient have prescription coverage?: Yes  Within the past 12 months, you worried that your food would run out before you got the money to buy more.: Never true  Within the past 12 months, the food you bought just didn't last and you didn't have money to get more.: Never true  Food insecurity resource given?: N/A  Does patient receive dialysis treatments?: No  Does patient have a history of substance abuse?: No  Does patient have a history of Mental Health Diagnosis?: No         Means of Transportation  Means of Transport to Appts[de-identified] Drives Self  In the past 12 months, has lack of transportation kept you from medical appointments or from getting medications?: No  In the past 12 months, has lack of transportation kept you from meetings, work, or from getting things needed for daily living?: No  Was application for public transport provided?: N/A        DISCHARGE DETAILS:    Discharge planning discussed with[de-identified] pt  Freedom of Choice: Yes  Comments - Freedom of Choice: Pt is open to a blanket referral     Were Treatment Team discharge recommendations reviewed with patient/caregiver?: Yes  Did patient/caregiver verbalize understanding of patient care needs?: Yes  Were patient/caregiver advised of the risks associated with not following Treatment Team discharge recommendations?: Yes    Contacts  Reason/Outcome: Discharge [facility]         Is the patient interested in [address] at discharge?: Yes  [facility] requested[de-identified] [address] Provider[de-identified] PCP  [facility]         Other Referral/Resources/Interventions Provided:  Interventions: [address]         Treatment Team Recommendation: Home with [address] CM met with pt at bedside to discuss post discharge needs. Pt stated that her daughter may be teachable but is squeamish around wounds. CM placed referral for SN for wound care.

## 2023-08-11 NOTE — CASE MANAGEMENT
Case Management Discharge Planning Note    Patient name Mahendra Smith  Location [address] [facility] [address]/-01 MRN [id number]  : 1970 Date 2023       Current Admission Date: 2023  Current Admission Diagnosis:Sepsis [facility])  Patient Active Problem List    Diagnosis Date Noted   • Sepsis ([address]) 2023   • Status post partial mastectomy of left breast 07/15/2022   • Dilated cardiomyopathy ([address]) 2021   • Essential hypertension 2021   • Malignant neoplasm of upper-inner quadrant of left breast in female, estrogen receptor negative ([address]) 10/19/2021      LOS (days): 2  Geometric Mean LOS (GMLOS) (days):   Days to GMLOS:     OBJECTIVE:  Risk of Unplanned Readmission Score: 11.47         Current admission status: Inpatient   Preferred Pharmacy:   [address] [place] [address]  Phone: [phone] Fax: [phone]    Primary Care Provider: Lj Pritchett MD    Primary Insurance: [address]  Secondary Insurance:     DISCHARGE DETAILS:     CM at bedside to present list of [facility]. Kathryn Oroyoung is the only [facility] available accepting pt's insurance. Pt stated that she and her daughter would be teachable for dressing changes.

## 2023-08-11 NOTE — PLAN OF CARE
Problem: PAIN - ADULT  Goal: Verbalizes/displays adequate comfort level or baseline comfort level  Description: Interventions:  - Encourage patient to monitor pain and request assistance  - Assess pain using appropriate pain scale  - Administer analgesics based on type and severity of pain and evaluate response  - Implement non-pharmacological measures as appropriate and evaluate response  - Consider cultural and social influences on pain and pain management  - Notify physician/advanced practitioner if interventions unsuccessful or patient reports new pain  Outcome: Progressing     Problem: INFECTION - ADULT  Goal: Absence or prevention of progression during hospitalization  Description: INTERVENTIONS:  - Assess and monitor for signs and symptoms of infection  - Monitor lab/diagnostic results  - Monitor all insertion sites, i.e. indwelling lines, tubes, and drains  - Monitor endotracheal if appropriate and nasal secretions for changes in amount and color  - [place] appropriate cooling/warming therapies per order  - Administer medications as ordered  - Instruct and encourage patient and family to use good hand hygiene technique  - Identify and instruct in appropriate isolation precautions for identified infection/condition  Outcome: Progressing     Problem: SKIN/TISSUE INTEGRITY - ADULT  Goal: Skin Integrity remains intact(Skin Breakdown Prevention)  Description: Assess:  -Perform Micah assessment every   -Clean and moisturize skin every   -Inspect skin when repositioning, toileting, and assisting with ADLS  -Assess under medical devices such as  every   -Assess extremities for adequate circulation and sensation     Bed Management:  -Have minimal linens on bed & keep smooth, unwrinkled  -Change linens as needed when moist or perspiring  -Avoid sitting or lying in one position for more than  hours while in bed  -Keep HOB at degrees     Toileting:  -Offer bedside commode  -Assess for incontinence every   -Use incontinent care products after each incontinent episode such as     Activity:  -Mobilize patient  times a day  -Encourage activity and walks on unit  -Encourage or provide ROM exercises   -Turn and reposition patient every  Hours  -Use appropriate equipment to lift or move patient in bed  -Instruct/ Assist with weight shifting every  when out of bed in chair  -Consider limitation of chair time  hour intervals    Skin Care:  -Avoid use of baby powder, tape, friction and shearing, hot water or constrictive clothing  -Relieve pressure over bony prominences using   -Do not massage red bony areas    Next Steps:  -Teach patient strategies to minimize risks such as    -Consider consults to  interdisciplinary teams such as   Outcome: Progressing     Problem: HEMATOLOGIC - ADULT  Goal: Maintains hematologic stability  Description: INTERVENTIONS  - Assess for signs and symptoms of bleeding or hemorrhage  - Monitor labs  - Administer supportive blood products/factors as ordered and appropriate  Outcome: Progressing

## 2023-08-12 ENCOUNTER — HOME HEALTH ADMISSION (OUTPATIENT)
Dept: HOME HEALTH SERVICES | Facility: HOME HEALTHCARE | Age: 53
End: 2023-08-12
Payer: COMMERCIAL

## 2023-08-12 VITALS
OXYGEN SATURATION: 96 % | HEIGHT: 71 IN | SYSTOLIC BLOOD PRESSURE: 114 MMHG | BODY MASS INDEX: 30.52 KG/M2 | TEMPERATURE: 97.7 F | DIASTOLIC BLOOD PRESSURE: 77 MMHG | WEIGHT: 218.03 LBS | RESPIRATION RATE: 16 BRPM | HEART RATE: 88 BPM

## 2023-08-12 LAB
ERYTHROCYTE [DISTWIDTH] IN BLOOD BY AUTOMATED COUNT: 14.1 % (ref 11.6–15.1)
HCT VFR BLD AUTO: 30.6 % (ref 34.8–46.1)
HGB BLD-MCNC: 9.9 G/DL (ref 11.5–15.4)
IMM GRANULOCYTES # BLD AUTO: 0.43 THOUSAND/UL (ref 0–0.2)
MCH RBC QN AUTO: 28.1 PG (ref 26.8–34.3)
MCHC RBC AUTO-ENTMCNC: 32.4 G/DL (ref 31.4–37.4)
MCV RBC AUTO: 87 FL (ref 82–98)
NRBC BLD AUTO-RTO: 0 /100 WBCS
PLATELET # BLD AUTO: 409 THOUSANDS/UL (ref 149–390)
PMV BLD AUTO: 9.1 FL (ref 8.9–12.7)
RBC # BLD AUTO: 3.52 MILLION/UL (ref 3.81–5.12)
WBC # BLD AUTO: 10.62 THOUSAND/UL (ref 4.31–10.16)

## 2023-08-12 PROCEDURE — 85027 COMPLETE CBC AUTOMATED: CPT | Performed by: SPECIALIST

## 2023-08-12 PROCEDURE — 99024 POSTOP FOLLOW-UP VISIT: CPT

## 2023-08-12 PROCEDURE — NC001 PR NO CHARGE

## 2023-08-12 RX ORDER — CEPHALEXIN 500 MG/1
500 CAPSULE ORAL EVERY 6 HOURS SCHEDULED
Qty: 28 CAPSULE | Refills: 0 | Status: SHIPPED | OUTPATIENT
Start: 2023-08-12 | End: 2023-08-19

## 2023-08-12 RX ADMIN — VITAM B12 100 MCG: 100 TAB at 08:26

## 2023-08-12 RX ADMIN — LOSARTAN POTASSIUM 50 MG: 50 TABLET, FILM COATED ORAL at 08:25

## 2023-08-12 RX ADMIN — DAPAGLIFLOZIN 10 MG: 10 TABLET, FILM COATED ORAL at 08:26

## 2023-08-12 RX ADMIN — MAGNESIUM 64 MG (MAGNESIUM CHLORIDE) TABLET,DELAYED RELEASE 64 MG: at 08:27

## 2023-08-12 RX ADMIN — CEPHALEXIN 500 MG: 500 CAPSULE ORAL at 05:12

## 2023-08-12 RX ADMIN — Medication 1 TABLET: at 08:25

## 2023-08-12 RX ADMIN — CEPHALEXIN 500 MG: 500 CAPSULE ORAL at 13:22

## 2023-08-12 RX ADMIN — ENOXAPARIN SODIUM 40 MG: 40 INJECTION SUBCUTANEOUS at 08:25

## 2023-08-12 NOTE — DISCHARGE INSTR - AVS FIRST PAGE
DISCHARGE INSTRUCTIONS    Follow Up: Follow up general surgery office in 1 week for wound check. Office will give you a call. Pain: You may take Tylenol and ibuprofen as needed for pain control with dressing changes. Antibiotics/Medications to start at time of discharge: You have been prescribed Keflex, an antibiotic, to take for 1 week. Wound care: Change packing daily. Shower daily and remove packing while in shower. Pack wound with half inch iodoform packing. Cut the end of the packing before cutting a piece. Use of forceps or Q-tip to apply packing into the base of cavity and fill the cavity loosely. It should require less packing each day. Apply an outer dressing with gauze and ABD, secure with tape. Activity: As tolerated. You may go up and down stairs, walk as much as you are comfortable, but walk at least 3 times each day. Medications: Resume all of your previous medications, unless told otherwise by the doctor. Call the office: If you are experiencing any of the following, fevers above 101.5° or chills, significant nausea or vomiting, increase in abdominal pain, if the wound develops drainage and/or is excessive redness around the wound, or if you have significant diarrhea or other worsening symptoms.

## 2023-08-12 NOTE — DISCHARGE SUMMARY
Discharge Summary - Juan Ellison 45 y.o. female MRN: [id number]    Unit/Bed#: -01 Encounter: [id number]    Admission Date:   Admission Orders (From admission, onward)     Ordered        08/09/23 [address]  Once                        Admitting Diagnosis: Leg pain [M79.606]  Right leg pain [M79.604]  Abscess of right thigh [L02.415]    HPI as per Dr. Alexandro Delgado MD: "Juan Ellison is a 45 y.o. female with history of stage IIa ER, MA negative, HER2 positive, infiltrating ductal carcinoma of the left breast hypertension,, status postlumpectomy and chemoradiation, HTN who presents with right thigh pain, spontaneous purulent drainage, with surrounding erythema from the surgical wound of her right medial thigh. Patient states that the close suction drain had been removed prior to her discharge from [facility] after gracillis transfer to her right face for treatment of facial paralysis due to Bell's palsy. Subsequent to discharge she began noting swelling, pain, spontaneous drainage, warmth and tenderness which was becoming unbearable, leading her to present to the emergency room where CT scan revealed a large complex fluid collection suspicious for abscess. This was subsequently drained through the surgical incision and packed open with iodoform gauze."     Procedures Performed:   Orders Placed This Encounter   Procedures   • ED ECG Documentation Only   • Incision and Drainage       Summary of Hospital Course: Juan Ellison is a 45 y.o. female with history of stage IIa ER, MA negative, HER2 positive, infiltrating ductal carcinoma of the left breast hypertension,, status postlumpectomy and chemoradiation, HTN who presents with right thigh pain, spontaneous purulent drainage, with surrounding erythema from the surgical wound of her right medial thigh.   Patient had surgery at [facility] for gracilis transfer to her right face for the treatment of Bell's palsy. Bedside incision and drainage right thigh abscess with packing placement was performed the same day as admission. Patient admitted for IV antibiotics. Patient underwent daily packing changes and teaching on packing changes. Wound culture was obtained during incision and drainage which grew Staphylococcus aureus and sensitive to Keflex. Home nursing was arranged. On the day of discharge, patient did experience a bump in leukocytosis after she had been transitioned to p.o. antibiotics, patient informed and instructed to continue a 1 week course of Keflex as an outpatient and instructed on any concerning signs and symptoms of when to call the office and return to ED. Patient tolerated two packing changes without need for pain medications. On day of discharge, pain controlled, urinating without difficulty, ambulating without difficulty, having tolerating, had packing teaching. Patient instructed to follow-up in 1 week and general surgery outpatient office. All discharge instructions discussed. Patient has follow-up with [address] [place] [address] eye and ear in November. Significant Findings, Care, Treatment and Services Provided:   CT RLE 8/9 impression: "Rim-enhancing fluid collection in the medial thigh, containing multiple locules of gas in the medial thigh. Findings are concerning for abscess.  Postoperative hematoma or seroma not excluded."    Bedside incision and drainage on 8/9 with serosanguineous and purulent output, cultures obtained    Cultures grew Staphylococcus aureus    Complications: None    Discharge Diagnosis: Leg pain [M79.606], s/p bedside I&D of right thigh abscess  Right leg pain [M79.604], s/p bedside I&D of right thigh abscess  Abscess of right thigh [L02.415], s/p bedside I&D of right thigh abscess    Medical Problems        Condition at Discharge: stable         Discharge instructions/Information to patient and family:   See after visit summary for information provided to patient and family. Provisions for Follow-Up Care:  See after visit summary for information related to follow-up care and any pertinent home health orders. PCP: Trenna Lesch, MD    Disposition: Home with home health    Planned Readmission: No      Discharge Statement   I spent 30 minutes discharging the patient. This time was spent on the day of discharge. I had direct contact with the patient on the day of discharge. Additional documentation is required if more than 30 minutes were spent on discharge. Discharge Medications:  See after visit summary for reconciled discharge medications provided to patient and family.

## 2023-08-12 NOTE — PROGRESS NOTES
Progress Note - General Surgery   Aris Bailey 45 y.o. female MRN: [id number]  Unit/Bed#: -01 Encounter: [id number]    Assessment:  64-year-old female with right thigh abscess  -Postop day 12 status post right gracilis muscle harvest (at [facility])  -Developed abscess at right gracilis muscle harvest site; status post bedside I&D of abscess 3 days ago  -Afebrile, remainder vital signs stable  -WBC 10.62 (8.5 yesterday)  -HGB 9.9 (10.1 yesterday)  -Wound culture grew Staphylococcus aureus, sensitive to Keflex    Plan:  -Discharge today  -Continue antibiotics, sensitivities came back yesterday with susceptibility to Keflex and patient was transitioned to Keflex yesterday; experienced increase in WBC with transition from IV to p.o. antibiotics, patient will be discharged on continued antibiotics for 1 week; discussed with patient and attending and agreeable for discharge today, continued antibiotics, and watching for any concerning signs and symptoms  -Patient prescribed Keflex for 1 week  -Packing change completed today with 1/2 iodoform packing  -Patient to follow-up in outpatient general surgery office next week  -Daily packing changes with half-inch iodoform  -[facility] arranged  -Analgesia prn   -Encourage ambulation   -Patient with follow up with [address] [place] [address] in November  -DVT ppx - Lovenox  -Discussed with Dr. Sharon Couch      Subjective/Objective   Subjective: Patient reporting pain has been improving, tolerated last packing change yesterday without any pain medication. Reports today is the "best I've felt."  Denies nausea, vomiting, fevers, chills, chest pain, shortness of breath. Patient reports bowel function, urinating without difficulty ambulating without difficulty. Objective:     Blood pressure 114/77, pulse 88, temperature 97.7 °F (36.5 °C), resp. rate 16, height 5' 11" (1.803 m), weight 98.9 kg (218 lb 0.6 oz), SpO2 96 %. ,Body mass index is 30.41 kg/m². Intake/Output Summary (Last 24 hours) at 8/12/2023 0840  Last data filed at 8/11/2023 2217  Gross per 24 hour   Intake 420 ml   Output 300 ml   Net 120 ml       Invasive Devices     Peripheral Intravenous Line  Duration           Peripheral IV 08/09/23 Right Antecubital 2 days                Physical Exam:   General - no acute distress  HEENT -incision around right ear clean, dry, intact, appears to be healing well; Bell's palsy of right side of face; ulcer present right inner lip  Heart -RRR  Lungs -clear to auscultation bilaterally  Abdomen -soft, nontender  Extremities -right inner thigh with overlying dressing with serous mixed with purulent appearance. Flushed with return of murky/serous appearing fluid that cleared with subsequent flushing. Mild tenderness to palpation. Nonerythematous, remains indurated. Reapply packing to inferior portion. Right inner thigh          Lab, Imaging and other studies:I have personally reviewed pertinent lab results.     VTE Pharmacologic Prophylaxis: Enoxaparin (Lovenox)    Rosie Diaz  8/12/2023

## 2023-08-12 NOTE — NURSING NOTE
Patient discharged home today; IV removed intact, no bleeding noted; AVS printed and reviewed with patient to include instructions on showering and wound care; patient verbalizes understanding; patient transported to exit via wheelchair accompanied by this RN.

## 2023-08-12 NOTE — PLAN OF CARE
Problem: PAIN - ADULT  Goal: Verbalizes/displays adequate comfort level or baseline comfort level  Description: Interventions:  - Encourage patient to monitor pain and request assistance  - Assess pain using appropriate pain scale  - Administer analgesics based on type and severity of pain and evaluate response  - Implement non-pharmacological measures as appropriate and evaluate response  - Consider cultural and social influences on pain and pain management  - Notify physician/advanced practitioner if interventions unsuccessful or patient reports new pain  Outcome: Progressing     Problem: INFECTION - ADULT  Goal: Absence or prevention of progression during hospitalization  Description: INTERVENTIONS:  - Assess and monitor for signs and symptoms of infection  - Monitor lab/diagnostic results  - Monitor all insertion sites, i.e. indwelling lines, tubes, and drains  - Monitor endotracheal if appropriate and nasal secretions for changes in amount and color  - [place] appropriate cooling/warming therapies per order  - Administer medications as ordered  - Instruct and encourage patient and family to use good hand hygiene technique  - Identify and instruct in appropriate isolation precautions for identified infection/condition  Outcome: Progressing

## 2023-08-12 NOTE — PLAN OF CARE
Problem: PAIN - ADULT  Goal: Verbalizes/displays adequate comfort level or baseline comfort level  Description: Interventions:  - Encourage patient to monitor pain and request assistance  - Assess pain using appropriate pain scale  - Administer analgesics based on type and severity of pain and evaluate response  - Implement non-pharmacological measures as appropriate and evaluate response  - Consider cultural and social influences on pain and pain management  - Notify physician/advanced practitioner if interventions unsuccessful or patient reports new pain  8/12/2023 1524 by Polina Mcconnell RN  Outcome: Adequate for Discharge  8/12/2023 0833 by Polina Mcconnell RN  Outcome: Progressing     Problem: INFECTION - ADULT  Goal: Absence or prevention of progression during hospitalization  Description: INTERVENTIONS:  - Assess and monitor for signs and symptoms of infection  - Monitor lab/diagnostic results  - Monitor all insertion sites, i.e. indwelling lines, tubes, and drains  - Monitor endotracheal if appropriate and nasal secretions for changes in amount and color  - [place] appropriate cooling/warming therapies per order  - Administer medications as ordered  - Instruct and encourage patient and family to use good hand hygiene technique  - Identify and instruct in appropriate isolation precautions for identified infection/condition  8/12/2023 1524 by Polina Mcconnell RN  Outcome: Adequate for Discharge  8/12/2023 0833 by Polina Mcconnell RN  Outcome: Progressing  Goal: Absence of fever/infection during neutropenic period  Description: INTERVENTIONS:  - Monitor WBC    8/12/2023 1524 by Polina Mcconnell RN  Outcome: Adequate for Discharge  8/12/2023 0833 by Polina Mcconnell RN  Outcome: Progressing     Problem: DISCHARGE PLANNING  Goal: Discharge to home or other facility with appropriate resources  Description: INTERVENTIONS:  - Identify barriers to discharge w/patient and caregiver  - Arrange for needed discharge resources and transportation as appropriate  - Identify discharge learning needs (meds, wound care, etc.)  - Arrange for interpretive services to assist at discharge as needed  - Refer to Case Management Department for coordinating discharge planning if the patient needs post-hospital services based on physician/advanced practitioner order or complex needs related to functional status, cognitive ability, or social support system  8/12/2023 1524 by Sita Degroot RN  Outcome: Adequate for Discharge  8/12/2023 0833 by Sita Degroot RN  Outcome: Progressing     Problem: Knowledge Deficit  Goal: Patient/family/caregiver demonstrates understanding of disease process, treatment plan, medications, and discharge instructions  Description: Complete learning assessment and assess knowledge base.   Interventions:  - Provide teaching at level of understanding  - Provide teaching via preferred learning methods  8/12/2023 1524 by Sita Degroot RN  Outcome: Adequate for Discharge  8/12/2023 0833 by Sita Degroot RN  Outcome: Progressing     Problem: SKIN/TISSUE INTEGRITY - ADULT  Goal: Skin Integrity remains intact(Skin Breakdown Prevention)  Description: Assess:      -Assess extremities for adequate circulation and sensation     Bed Management:  -Have minimal linens on bed & keep smooth, unwrinkled  -Change linens as needed when moist or perspiring      Toileting:  -Offer bedside commode      Activity:    -Encourage activity and walks on unit    -Use appropriate equipment to lift or move patient in bed      Skin Care:  -Avoid use of baby powder, tape, friction and shearing, hot water or constrictive clothing    -Do not massage red bony areas    Next Steps:    8/12/2023 1524 by Sita Degroot RN  Outcome: Adequate for Discharge  8/12/2023 0833 by Sita Degroot RN  Outcome: Progressing  Goal: Incision(s), wounds(s) or drain site(s) healing without S/S of infection  Description: INTERVENTIONS  - Assess and document dressing, incision, wound bed, drain sites and surrounding tissue  - Provide patient and family education    8/12/2023 1524 by Josué Kc RN  Outcome: Adequate for Discharge  8/12/2023 8471 by Josué Kc RN  Outcome: Progressing     Problem: HEMATOLOGIC - ADULT  Goal: Maintains hematologic stability  Description: INTERVENTIONS  - Assess for signs and symptoms of bleeding or hemorrhage  - Monitor labs  - Administer supportive blood products/factors as ordered and appropriate  8/12/2023 1524 by Josué Kc RN  Outcome: Adequate for Discharge  8/12/2023 0833 by Josué Kc RN  Outcome: Progressing

## 2023-08-12 NOTE — PLAN OF CARE
Problem: PAIN - ADULT  Goal: Verbalizes/displays adequate comfort level or baseline comfort level  Description: Interventions:  - Encourage patient to monitor pain and request assistance  - Assess pain using appropriate pain scale  - Administer analgesics based on type and severity of pain and evaluate response  - Implement non-pharmacological measures as appropriate and evaluate response  - Consider cultural and social influences on pain and pain management  - Notify physician/advanced practitioner if interventions unsuccessful or patient reports new pain  Outcome: Progressing     Problem: INFECTION - ADULT  Goal: Absence or prevention of progression during hospitalization  Description: INTERVENTIONS:  - Assess and monitor for signs and symptoms of infection  - Monitor lab/diagnostic results  - Monitor all insertion sites, i.e. indwelling lines, tubes, and drains  - Monitor endotracheal if appropriate and nasal secretions for changes in amount and color  - [place] appropriate cooling/warming therapies per order  - Administer medications as ordered  - Instruct and encourage patient and family to use good hand hygiene technique  - Identify and instruct in appropriate isolation precautions for identified infection/condition  Outcome: Progressing  Goal: Absence of fever/infection during neutropenic period  Description: INTERVENTIONS:  - Monitor WBC    Outcome: Progressing

## 2023-08-13 NOTE — UTILIZATION REVIEW
NOTIFICATION OF ADMISSION DISCHARGE   This is a Notification of Discharge from [address] [place] [address]. Please be advised that this patient has been discharge from our facility. Below you will find the admission and discharge date and time including the patient’s disposition. UTILIZATION REVIEW CONTACT:  Slim Benson  Utilization   Network Utilization Review Department  Phone: [phone] x carefully listen to the prompts. All voicemails are confidential.  Email: [email or web address]     ADMISSION INFORMATION  PRESENTATION DATE: 8/9/2023  9:55 AM  OBERVATION ADMISSION DATE:   INPATIENT ADMISSION DATE: 8/9/23  1:28 PM   DISCHARGE DATE: 8/12/2023  4:44 PM   DISPOSITION:Home with Home Health Care    IMPORTANT INFORMATION:  Send all requests for admission clinical reviews, approved or denied determinations and any other requests to dedicated fax number below belonging to the campus where the patient is receiving treatment.  List of dedicated fax numbers:  [place] DENIALS (Administrative/Medical Necessity) [phone] [address] [facility] (Maternity/NICU/Pediatrics) [phone]   [facility] [phone]   [facility] [phone] [address] [facility] [phone] [address] [facility] [phone]   [facility] [phone]   [address] [place] [address] [address] [place] [address] [phone]   [address] [place] [address] [phone] [address] [facility] [phone] 27[address] [facility] [address] [facility] [phone]

## 2023-08-14 LAB
BACTERIA BLD CULT: NORMAL
BACTERIA BLD CULT: NORMAL

## 2023-08-15 ENCOUNTER — HOME CARE VISIT (OUTPATIENT)
Dept: HOME HEALTH SERVICES | Facility: HOME HEALTHCARE | Age: 53
End: 2023-08-15
Payer: COMMERCIAL

## 2023-08-15 PROCEDURE — G0299 HHS/HOSPICE OF RN EA 15 MIN: HCPCS

## 2023-08-15 PROCEDURE — 400013 VN SOC

## 2023-08-17 VITALS
SYSTOLIC BLOOD PRESSURE: 122 MMHG | OXYGEN SATURATION: 97 % | BODY MASS INDEX: 27.3 KG/M2 | HEIGHT: 71 IN | HEART RATE: 74 BPM | DIASTOLIC BLOOD PRESSURE: 76 MMHG | RESPIRATION RATE: 16 BRPM | WEIGHT: 195 LBS | TEMPERATURE: 97.7 F

## 2023-08-18 ENCOUNTER — HOME CARE VISIT (OUTPATIENT)
Dept: HOME HEALTH SERVICES | Facility: HOME HEALTHCARE | Age: 53
End: 2023-08-18
Payer: COMMERCIAL

## 2023-08-21 ENCOUNTER — HOME CARE VISIT (OUTPATIENT)
Dept: HOME HEALTH SERVICES | Facility: HOME HEALTHCARE | Age: 53
End: 2023-08-21
Payer: COMMERCIAL

## 2023-08-21 PROCEDURE — G0299 HHS/HOSPICE OF RN EA 15 MIN: HCPCS

## 2023-08-22 PROCEDURE — G0180 MD CERTIFICATION HHA PATIENT: HCPCS | Performed by: SPECIALIST

## 2023-08-23 VITALS
DIASTOLIC BLOOD PRESSURE: 60 MMHG | TEMPERATURE: 98.6 F | HEART RATE: 90 BPM | OXYGEN SATURATION: 98 % | RESPIRATION RATE: 16 BRPM | SYSTOLIC BLOOD PRESSURE: 124 MMHG

## 2023-08-24 ENCOUNTER — HOME CARE VISIT (OUTPATIENT)
Dept: HOME HEALTH SERVICES | Facility: HOME HEALTHCARE | Age: 53
End: 2023-08-24
Payer: COMMERCIAL

## 2023-08-24 PROCEDURE — G0299 HHS/HOSPICE OF RN EA 15 MIN: HCPCS

## 2023-08-28 VITALS
TEMPERATURE: 97.6 F | RESPIRATION RATE: 16 BRPM | SYSTOLIC BLOOD PRESSURE: 130 MMHG | DIASTOLIC BLOOD PRESSURE: 70 MMHG | OXYGEN SATURATION: 98 % | HEART RATE: 90 BPM

## 2023-08-29 ENCOUNTER — HOME CARE VISIT (OUTPATIENT)
Dept: HOME HEALTH SERVICES | Facility: HOME HEALTHCARE | Age: 53
End: 2023-08-29
Payer: COMMERCIAL

## 2023-08-31 ENCOUNTER — HOME CARE VISIT (OUTPATIENT)
Dept: HOME HEALTH SERVICES | Facility: HOME HEALTHCARE | Age: 53
End: 2023-08-31
Payer: COMMERCIAL

## 2023-08-31 PROCEDURE — G0299 HHS/HOSPICE OF RN EA 15 MIN: HCPCS

## 2023-09-04 VITALS
TEMPERATURE: 97.9 F | RESPIRATION RATE: 16 BRPM | SYSTOLIC BLOOD PRESSURE: 112 MMHG | HEART RATE: 82 BPM | OXYGEN SATURATION: 99 % | DIASTOLIC BLOOD PRESSURE: 70 MMHG

## 2023-10-11 PROBLEM — A41.9 SEPSIS (HCC): Status: RESOLVED | Noted: 2023-08-09 | Resolved: 2023-10-11

## 2024-01-26 ENCOUNTER — APPOINTMENT (OUTPATIENT)
Dept: LAB | Facility: HOSPITAL | Age: 54
End: 2024-01-26
Payer: COMMERCIAL

## 2024-01-26 DIAGNOSIS — I10 ESSENTIAL HYPERTENSION: ICD-10-CM

## 2024-01-26 DIAGNOSIS — R73.09 ABNORMAL GLUCOSE: ICD-10-CM

## 2024-01-26 DIAGNOSIS — R53.83 FATIGUE, UNSPECIFIED TYPE: ICD-10-CM

## 2024-01-26 DIAGNOSIS — E78.5 HYPERLIPIDEMIA, UNSPECIFIED HYPERLIPIDEMIA TYPE: ICD-10-CM

## 2024-01-26 DIAGNOSIS — D64.9 ANEMIA, UNSPECIFIED TYPE: ICD-10-CM

## 2024-01-26 LAB
ALBUMIN SERPL BCP-MCNC: 4.3 G/DL (ref 3.5–5)
ALP SERPL-CCNC: 68 U/L (ref 34–104)
ALT SERPL W P-5'-P-CCNC: 16 U/L (ref 7–52)
ANION GAP SERPL CALCULATED.3IONS-SCNC: 7 MMOL/L
AST SERPL W P-5'-P-CCNC: 16 U/L (ref 13–39)
BILIRUB SERPL-MCNC: 0.81 MG/DL (ref 0.2–1)
BUN SERPL-MCNC: 18 MG/DL (ref 5–25)
CALCIUM SERPL-MCNC: 9.3 MG/DL (ref 8.4–10.2)
CHLORIDE SERPL-SCNC: 105 MMOL/L (ref 96–108)
CHOLEST SERPL-MCNC: 203 MG/DL
CO2 SERPL-SCNC: 26 MMOL/L (ref 21–32)
CREAT SERPL-MCNC: 0.83 MG/DL (ref 0.6–1.3)
ERYTHROCYTE [DISTWIDTH] IN BLOOD BY AUTOMATED COUNT: 16.9 % (ref 11.6–15.1)
FERRITIN SERPL-MCNC: 8 NG/ML (ref 11–307)
FOLATE SERPL-MCNC: 19.7 NG/ML
GFR SERPL CREATININE-BSD FRML MDRD: 79 ML/MIN/1.73SQ M
GLUCOSE 1H P 75 G GLC PO SERPL-MCNC: 164 MG/DL (ref 70–183)
GLUCOSE 2H P 75 G GLC PO SERPL-MCNC: 189 MG/DL (ref 70–155)
GLUCOSE P FAST SERPL-MCNC: 103 MG/DL (ref 65–99)
GLUCOSE P FAST SERPL-MCNC: 104 MG/DL (ref 65–99)
HCT VFR BLD AUTO: 41.7 % (ref 34.8–46.1)
HDLC SERPL-MCNC: 60 MG/DL
HGB BLD-MCNC: 12.8 G/DL (ref 11.5–15.4)
INSULIN SERPL-ACNC: 11.16 UIU/ML (ref 1.9–23)
INSULIN SERPL-ACNC: 79.22 UIU/ML
INSULIN SERPL-ACNC: 90.51 UIU/ML
IRON SATN MFR SERPL: 9 % (ref 15–50)
IRON SERPL-MCNC: 37 UG/DL (ref 50–212)
LDLC SERPL CALC-MCNC: 128 MG/DL (ref 0–100)
MCH RBC QN AUTO: 25 PG (ref 26.8–34.3)
MCHC RBC AUTO-ENTMCNC: 30.7 G/DL (ref 31.4–37.4)
MCV RBC AUTO: 82 FL (ref 82–98)
NONHDLC SERPL-MCNC: 143 MG/DL
PLATELET # BLD AUTO: 316 THOUSANDS/UL (ref 149–390)
PMV BLD AUTO: 9.4 FL (ref 8.9–12.7)
POTASSIUM SERPL-SCNC: 4.2 MMOL/L (ref 3.5–5.3)
PROT SERPL-MCNC: 7.5 G/DL (ref 6.4–8.4)
RBC # BLD AUTO: 5.11 MILLION/UL (ref 3.81–5.12)
SODIUM SERPL-SCNC: 138 MMOL/L (ref 135–147)
TIBC SERPL-MCNC: 400 UG/DL (ref 250–450)
TRIGL SERPL-MCNC: 75 MG/DL
TSH SERPL DL<=0.05 MIU/L-ACNC: 2 UIU/ML (ref 0.45–4.5)
UIBC SERPL-MCNC: 363 UG/DL (ref 155–355)
VIT B12 SERPL-MCNC: 563 PG/ML (ref 180–914)
WBC # BLD AUTO: 6.5 THOUSAND/UL (ref 4.31–10.16)

## 2024-01-26 PROCEDURE — 80053 COMPREHEN METABOLIC PANEL: CPT

## 2024-01-26 PROCEDURE — 85027 COMPLETE CBC AUTOMATED: CPT

## 2024-01-26 PROCEDURE — 83550 IRON BINDING TEST: CPT

## 2024-01-26 PROCEDURE — 36415 COLL VENOUS BLD VENIPUNCTURE: CPT

## 2024-01-26 PROCEDURE — 82728 ASSAY OF FERRITIN: CPT

## 2024-01-26 PROCEDURE — 84443 ASSAY THYROID STIM HORMONE: CPT

## 2024-01-26 PROCEDURE — 83525 ASSAY OF INSULIN: CPT

## 2024-01-26 PROCEDURE — 82746 ASSAY OF FOLIC ACID SERUM: CPT

## 2024-01-26 PROCEDURE — 82951 GLUCOSE TOLERANCE TEST (GTT): CPT

## 2024-01-26 PROCEDURE — 82607 VITAMIN B-12: CPT

## 2024-01-26 PROCEDURE — 83540 ASSAY OF IRON: CPT

## 2024-01-26 PROCEDURE — 80061 LIPID PANEL: CPT

## 2024-08-10 ENCOUNTER — APPOINTMENT (OUTPATIENT)
Dept: LAB | Facility: HOSPITAL | Age: 54
End: 2024-08-10
Payer: COMMERCIAL

## 2024-08-10 ENCOUNTER — TRANSCRIBE ORDERS (OUTPATIENT)
Dept: ADMINISTRATIVE | Facility: HOSPITAL | Age: 54
End: 2024-08-10

## 2024-08-10 DIAGNOSIS — R73.09 ABNORMAL GLUCOSE: Primary | ICD-10-CM

## 2024-08-10 DIAGNOSIS — R73.09 ABNORMAL GLUCOSE: ICD-10-CM

## 2024-08-10 DIAGNOSIS — G47.8 NON-RESTORATIVE SLEEP: ICD-10-CM

## 2024-08-10 LAB
GLUCOSE 2H P 75 G GLC PO SERPL-MCNC: 139 MG/DL (ref 70–139)
GLUCOSE P FAST SERPL-MCNC: 98 MG/DL (ref 65–99)

## 2024-08-10 PROCEDURE — 36415 COLL VENOUS BLD VENIPUNCTURE: CPT

## 2024-08-10 PROCEDURE — 82947 ASSAY GLUCOSE BLOOD QUANT: CPT

## 2024-08-10 PROCEDURE — 82950 GLUCOSE TEST: CPT

## 2024-09-27 RX ORDER — OMEPRAZOLE 40 MG/1
CAPSULE, DELAYED RELEASE ORAL
COMMUNITY
Start: 2024-01-11

## 2024-10-07 NOTE — PROGRESS NOTES
Pulmonary Consultation   Hailee Marie 53 y.o. female MRN: [id number]  10/8/2024      Assessment:  Positive DESHAUN screen  + Excessive daytime sleepiness/fatigue, unrefreshing sleep and was told she snores  Has difficulty falling/staying asleep  Reports infrequent urge feeling to move her legs/?  RLS    Plan:  Given the above/hx CHF ordered in lab diagnostic sleep study      Pulmonary nodule  RUL 6 mm GGO, noted in 2022  Given the high risk, hx of breast cancer needed 6 to 12 months follow-up CT chest  Ordered noncontrast CT chest    Return in about 6 weeks (around 11/19/2024).        History of Present Illness     New Consult for: Concerned DESHAUN/pulmonary nodule      Background  53 y.o. female with a h/o HTN, dilated cardiomyopathy, breast cancer/left acid reflux, recurrent right Bell's palsy s/p craniotomy/facial nerve reconstruction     Reports a chronic unrefreshing sleep, has a problem falling asleep and staying asleep at night.  Reports frequent awakening and was told that she snores.  No prior formal diagnosis of DESHAUN.  Currently works at the [employer], goes to bed around 11:00.  Does not takes naps during the day but has some difficulty staying awake.  No drowsiness/dozing off.  Sometimes feels urge to move her leg frequently    Sleep History:   Bedtime:  11-12   Latency:  ~8   Wakeup time:  8-9 am     Awakenings:   Frequency:  2-3 per night  Causes: thirsty/dry mouth    Duration: 10 min     Daytime Sleep:   Most severe: feels sleepy  Naps:  no   Dozing off/drowsiness: no   Snoring: no   Apnea: unknown      Change in Weight:10-15 lb over a year   Restless Leg Syndrome: sometimes      Social History:   Caffeine:  am 2 cups max 10 oz   Tobacco:   no                        Alcohol:   no   Drugs:   no     Reports diagnosis of breast cancer in 2021, s/p left partial mastectomy, chemo/radiation and immunotherapy for 1 year, reported to be in remission since then.  Most recent CT chest done in 2022 showed GGO 6 mm nodule at  "TIM, recommended 6 to 12 months follow-up but was not completed.    She is a non-smoker, smoked intermittently in the past but not regular every day smoker.  Nonalcoholic no recreational drug use.      Review of Systems  As per hpi, all other systems reviewed and were negative.        Studies:  Imaging and other studies: I have personally reviewed pertinent films in PACS      Pulmonary function testing:       EKG, Pathology, and Other Studies: I have personally reviewed pertinent reports.          Historical Information   Past Medical History:   Diagnosis Date    Allergic     Bell's palsy     Hypertension      Past Surgical History:   Procedure Laterality Date    GRACILLIS MUSCLE TRANSFER AND  STEINDLER FLEXOR PLASTY Right     NO PAST SURGERIES       History reviewed. No pertinent family history.      Medications/Allergies: Reviewed    Vitals: Blood pressure 128/76, pulse 88, temperature (!) 97.2 °F (36.2 °C), height 5' 11\" (1.803 m), weight 91.6 kg (202 lb), SpO2 97%. Body mass index is 28.17 kg/m². Oxygen Therapy  SpO2: 97 %      Physical Exam  Body mass index is 28.17 kg/m².   Gen: not in acute distress,   Neck/Eyes: supple, no adenopathy, PERRL  Ear: normal appearance, no significant hearing impairment  Nose:  normal nasal mucosa, no drainage  Mouth:  unremarkable/normal appearance of lips, Mallampati type I  Oropharynx: mucosa is moist, no focal lesions or erythema  Salivary glands: soft nontender  Chest: normal respiratory efforts, clear breath sounds bilaterally  CV: RRR, no murmurs appreciated, no edema  Abdomen: soft, non tender  Extremities:  No observed deformity   Skin: unremarkable  Neuro: AAO X3, no focal motor deficit         Labs:  Lab Results   Component Value Date    WBC 6.50 01/26/2024    HGB 12.8 01/26/2024    HCT 41.7 01/26/2024    MCV 82 01/26/2024     01/26/2024     Lab Results   Component Value Date    CALCIUM 9.9 07/30/2024    K 4.2 07/30/2024    CO2 24.0 07/30/2024     " "01/26/2024    BUN 27 (H) 07/30/2024    CREATININE 0.75 07/30/2024     No results found for: \"IGE\"  Lab Results   Component Value Date    ALT 29 07/30/2024    AST 22 07/30/2024    ALKPHOS 74 07/30/2024           Portions of the record may have been created with voice recognition software.  Occasional wrong word or \"sound a like\" substitutions may have occurred due to the inherent limitations of voice recognition software.  Read the chart carefully and recognize, using context, where substitutions have occurred    Jericho Darling M.D.  [facility]  "

## 2024-10-08 ENCOUNTER — CONSULT (OUTPATIENT)
Dept: PULMONOLOGY | Facility: CLINIC | Age: 54
End: 2024-10-08

## 2024-10-08 VITALS
BODY MASS INDEX: 28.28 KG/M2 | SYSTOLIC BLOOD PRESSURE: 128 MMHG | DIASTOLIC BLOOD PRESSURE: 76 MMHG | TEMPERATURE: 97.2 F | HEIGHT: 71 IN | HEART RATE: 88 BPM | OXYGEN SATURATION: 97 % | WEIGHT: 202 LBS

## 2024-10-08 DIAGNOSIS — G47.19 EXCESSIVE DAYTIME SLEEPINESS: Primary | ICD-10-CM

## 2024-10-08 DIAGNOSIS — R91.1 PULMONARY NODULE: ICD-10-CM

## 2024-10-08 DIAGNOSIS — G47.8 NON-RESTORATIVE SLEEP: ICD-10-CM

## 2024-10-08 RX ORDER — ERGOCALCIFEROL 1.25 MG/1
CAPSULE, LIQUID FILLED ORAL
COMMUNITY
Start: 2024-09-09

## 2024-10-08 RX ORDER — PANTOPRAZOLE SODIUM 40 MG/1
TABLET, DELAYED RELEASE ORAL
COMMUNITY
Start: 2024-09-24

## 2024-10-12 ENCOUNTER — TRANSCRIBE ORDERS (OUTPATIENT)
Dept: SLEEP CENTER | Facility: CLINIC | Age: 54
End: 2024-10-12

## 2024-10-12 DIAGNOSIS — G47.19 EXCESSIVE DAYTIME SLEEPINESS: Primary | ICD-10-CM

## 2024-10-17 ENCOUNTER — HOSPITAL ENCOUNTER (OUTPATIENT)
Dept: CT IMAGING | Facility: HOSPITAL | Age: 54
End: 2024-10-17
Attending: INTERNAL MEDICINE
Payer: COMMERCIAL

## 2024-10-17 DIAGNOSIS — R91.1 PULMONARY NODULE: ICD-10-CM

## 2024-10-17 PROCEDURE — 71250 CT THORAX DX C-: CPT

## 2024-11-04 NOTE — PROGRESS NOTES
Pulmonary Follow Up Note   Hailee Marie 53 y.o. female MRN: [id number]  11/5/2024    Assessment:  Positive DESHAUN screen  As per assessment at last visit  + Excessive daytime sleepiness/fatigue, unrefreshing sleep  Family reported that she snores loudly  Also has a difficulty falling/staying asleep  Reports infrequent urge to move her legs    Plan:  Given the history of CHF, ordered in lab sleep study    Pulmonary nodule  RUL 6 mm GGO, noted in 2022  Stable on last check 10/2024  Possible adenocarcinoma spectrum/lipidic growth  Will follow-up until 5 years  CT chest ordered to be done in 10/2025    Return in about 3 months (around 2/5/2025).    History of Present Illness     Follow up for: Concerned DESHAUN/pulmonary nodule        Background  53 y.o. female with a h/o HTN, dilated cardiomyopathy, breast cancer/left acid reflux, recurrent right Bell's palsy s/p craniotomy/facial nerve reconstruction      Reports a chronic unrefreshing sleep, has a problem falling asleep and staying asleep at night.  Reports frequent awakening and was told that she snores.  No prior formal diagnosis of DESHAUN.  Currently works at the [employer], goes to bed around 11:00.  Does not takes naps during the day but has some difficulty staying awake.  No drowsiness/dozing off.  Sometimes feels urge to move her leg frequently     Sleep History:   Bedtime:  11-12   Latency:  ~8   Wakeup time:  8-9 am      Awakenings:   Frequency:  2-3 per night  Causes: thirsty/dry mouth    Duration: 10 min     Daytime Sleep:   Most severe: feels sleepy  Naps:  no   Dozing off/drowsiness: no   Snoring: no   Apnea: unknown      Change in Weight:10-15 lb over a year   Restless Leg Syndrome: sometimes      Social History:   Caffeine:  am 2 cups max 10 oz   Tobacco:   no                        Alcohol:   no   Drugs:   no      Reports diagnosis of breast cancer in 2021, s/p left partial mastectomy, chemo/radiation and immunotherapy for 1 year, reported to be in remission since  "then.  Most recent CT chest done in 2022 showed GGO 6 mm nodule at RUL, recommended 6 to 12 months follow-up but was not completed.     She is a non-smoker, smoked intermittently in the past but not regular every day smoker.  Nonalcoholic no recreational drug use.      10/2024 visit-diagnostic sleep study ordered, CT chest    Interval History  Since last seen, no major changes.  Continued to be active/independent at baseline.  Underwent eye surgery/for ptosis repair.  Sleep study was not scheduled yet      Review of Systems  As per hpi, all other systems reviewed and were negative    Studies:    Imaging and other studies: I have personally reviewed pertinent films in PACS    CT chest 10/17/2024-RUL 6 mm GGO, 3 years stability by report.  Recommends follow-up until 5 years of stability.  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  Fatty liver, small hiatal hernia.      Pulmonary function testing:       EKG, Pathology, and Other Studies: I have personally reviewed pertinent reports.        Past medical, surgical, social and family histories reviewed.      Medications/Allergies: Reviewed      Vitals: Blood pressure 126/88, pulse 101, temperature 98.1 °F (36.7 °C), height 5' 11\" (1.803 m), weight 91.6 kg (202 lb), SpO2 97%. Body mass index is 28.17 kg/m². Oxygen Therapy  SpO2: 97 %      Physical Exam  Body mass index is 28.17 kg/m².   Gen: not in acute distress,   Neck/Eyes: supple, no adenopathy, PERRL, mild ecchymoses below the right eyelid, sutures at the upper eyelids  Ear: normal appearance, no significant hearing impairment  Nose:  normal nasal mucosa, no drainage  Mouth:  unremarkable/normal appearance of lips, teeth and gums  Oropharynx: mucosa is moist, no focal lesions or erythema  Salivary glands: soft nontender  Chest: normal respiratory efforts, clear breath sounds bilaterally  CV: RRR, no murmurs appreciated, no edema  Abdomen: soft, non tender  Extremities:  No " "observed deformity   Skin: unremarkable  Neuro: AAO X3, no focal motor deficit        Labs:  Lab Results   Component Value Date    WBC 6.50 01/26/2024    HGB 12.8 01/26/2024    HCT 41.7 01/26/2024    MCV 82 01/26/2024     01/26/2024     Lab Results   Component Value Date    CALCIUM 9.9 07/30/2024    K 4.2 07/30/2024    CO2 24.0 07/30/2024     01/26/2024    BUN 27 (H) 07/30/2024    CREATININE 0.75 07/30/2024     No results found for: \"IGE\"  Lab Results   Component Value Date    ALT 29 07/30/2024    AST 22 07/30/2024    ALKPHOS 74 07/30/2024           Portions of the record may have been created with voice recognition software.  Occasional wrong word or \"sound a like\" substitutions may have occurred due to the inherent limitations of voice recognition software.  Read the chart carefully and recognize, using context, where substitutions have occurred    Jericho Darling M.D.  [facility]  "

## 2024-11-05 ENCOUNTER — OFFICE VISIT (OUTPATIENT)
Dept: PULMONOLOGY | Facility: CLINIC | Age: 54
End: 2024-11-05

## 2024-11-05 VITALS
SYSTOLIC BLOOD PRESSURE: 126 MMHG | WEIGHT: 202 LBS | TEMPERATURE: 98.1 F | OXYGEN SATURATION: 97 % | BODY MASS INDEX: 28.28 KG/M2 | DIASTOLIC BLOOD PRESSURE: 88 MMHG | HEART RATE: 101 BPM | HEIGHT: 71 IN

## 2024-11-05 DIAGNOSIS — G47.8 NON-RESTORATIVE SLEEP: ICD-10-CM

## 2024-11-05 DIAGNOSIS — R91.1 PULMONARY NODULE: Primary | ICD-10-CM

## 2024-11-05 DIAGNOSIS — G47.19 EXCESSIVE DAYTIME SLEEPINESS: ICD-10-CM

## 2024-11-05 RX ORDER — ERYTHROMYCIN 5 MG/G
OINTMENT OPHTHALMIC
COMMUNITY
Start: 2024-10-28

## 2024-12-13 ENCOUNTER — TELEPHONE (OUTPATIENT)
Age: 54
End: 2024-12-13

## 2024-12-16 NOTE — TELEPHONE ENCOUNTER
Not a patient of the sleep office.  If Provider is agreeable, Pulm needs to send order to [facility].    
Pt asking if referral for sleep study can be re routed to [facility]    Stating they can get her in sooner and it is closer for her.    [facility] FAX : [phone]    Phone: [phone]    Pt was told it can also be sent through [facility]    Pt requesting a call back when completed.  
stated

## 2025-04-03 ENCOUNTER — HOSPITAL ENCOUNTER (OUTPATIENT)
Dept: SLEEP CENTER | Facility: CLINIC | Age: 55
Discharge: HOME | End: 2025-04-03
Payer: COMMERCIAL

## 2025-04-03 DIAGNOSIS — G47.19 EXCESSIVE DAYTIME SLEEPINESS: ICD-10-CM

## 2025-04-03 PROCEDURE — 95810 POLYSOM 6/> YRS 4/> PARAM: CPT

## 2025-04-03 PROCEDURE — 95810 POLYSOM 6/> YRS 4/> PARAM: CPT | Performed by: INTERNAL MEDICINE

## 2025-04-04 ENCOUNTER — TELEPHONE (OUTPATIENT)
Dept: PULMONOLOGY | Facility: CLINIC | Age: 55
End: 2025-04-04

## 2025-04-04 PROBLEM — G47.33 OSA (OBSTRUCTIVE SLEEP APNEA): Status: ACTIVE | Noted: 2025-04-04

## 2025-04-04 NOTE — PROGRESS NOTES
Sleep Study Documentation    Pre-Sleep Study       Sleep testing procedure explained to patient:YES    Patient napped prior to study:NO    Caffeine:Dayshift worker after 12PM.  Caffeine use:NO    Alcohol:Dayshift workers after 5PM: Alcohol use:NO    Typical day for patient:YES       Study Documentation    Sleep Study Indications: snoring, daytime sleepiness    Sleep Study: Diagnostic   Snore:Moderate  Supplemental O2: no        Minimum SaO2 73  Baseline SaO2 93        EKG abnormalities: yes:  EPOCH example and comments: occ. MF pvc's    EEG abnormalities: no    Were abnormal behaviors in sleep observed:NO    Is Total Sleep Study Recording Time < 2 hours: N/A    Is Total Sleep Study Recording Time > 2 hours but study is incomplete: N/A    Is Total Sleep Study Recording Time 6 hours or more but sleep was not obtained: NO          Post-Sleep Study    Medication used at bedtime or during sleep study:NO    Patient reports time it took to fall asleep:20 to 30 minutes    Patient reports waking up during study:1 to 2 times.  Patient reports returning to sleep in 10 to 30 minutes.    Patient reports sleeping 6 to 8 hours without dreaming.    Does the Patient feel this is a typical night of sleep:better than usual    Patient rated sleepiness: Very sleepy or tired    PAP treatment:no.

## 2025-04-04 NOTE — TELEPHONE ENCOUNTER
----- Message from Nj Zimmerman MD sent at 4/4/2025  3:53 PM EDT -----  Hi Echavarria,    This lady definitely has classic sleep apnea that ended up being in the mild category but in the supine position is severe.  If she has daytime sleepiness she should trial CPAP.    Let me know if you speak to her and she is agreeable for CPAP - I will order it and message the pool.  Thank you.    Tanner

## 2025-05-01 ENCOUNTER — TELEPHONE (OUTPATIENT)
Dept: PULMONOLOGY | Facility: CLINIC | Age: 55
End: 2025-05-01

## 2025-05-16 ENCOUNTER — TELEPHONE (OUTPATIENT)
Dept: OTHER | Facility: HOSPITAL | Age: 55
End: 2025-05-16

## 2025-05-16 DIAGNOSIS — G47.33 OSA (OBSTRUCTIVE SLEEP APNEA): Primary | ICD-10-CM

## 2025-05-20 ENCOUNTER — TELEPHONE (OUTPATIENT)
Dept: PULMONOLOGY | Facility: CLINIC | Age: 55
End: 2025-05-20

## 2025-07-02 ENCOUNTER — TELEPHONE (OUTPATIENT)
Age: 55
End: 2025-07-02

## 2025-07-02 NOTE — TELEPHONE ENCOUNTER
Patient called to schedule a compliance visit for her CPAP. Patient states she picked up her CPAP on 6/11. Patient was informed by the DME that she needs to make a follow up appointment with the provider and was unsure of the time frame they gave her. I advised patient that she has to follow up with us 31-90 days since receiving her CPAP. Patient has been scheduled with Dr. Darling for 8/26.      Thank you.

## 2025-08-26 PROBLEM — R91.1 PULMONARY NODULE: Status: ACTIVE | Noted: 2025-08-26
